# Patient Record
Sex: FEMALE | Race: WHITE | NOT HISPANIC OR LATINO | Employment: FULL TIME | URBAN - METROPOLITAN AREA
[De-identification: names, ages, dates, MRNs, and addresses within clinical notes are randomized per-mention and may not be internally consistent; named-entity substitution may affect disease eponyms.]

---

## 2020-09-23 ENCOUNTER — OFFICE VISIT (OUTPATIENT)
Dept: URGENT CARE | Facility: URGENT CARE | Age: 35
End: 2020-09-23
Payer: COMMERCIAL

## 2020-09-23 VITALS
OXYGEN SATURATION: 100 % | SYSTOLIC BLOOD PRESSURE: 132 MMHG | HEART RATE: 69 BPM | TEMPERATURE: 99.1 F | DIASTOLIC BLOOD PRESSURE: 75 MMHG | RESPIRATION RATE: 16 BRPM

## 2020-09-23 DIAGNOSIS — S61.313A LACERATION OF LEFT MIDDLE FINGER WITHOUT FOREIGN BODY WITH DAMAGE TO NAIL, INITIAL ENCOUNTER: ICD-10-CM

## 2020-09-23 DIAGNOSIS — Z23 VACCINE FOR DIPHTHERIA-TETANUS-PERTUSSIS, COMBINED: Primary | ICD-10-CM

## 2020-09-23 PROCEDURE — 90471 IMMUNIZATION ADMIN: CPT | Performed by: FAMILY MEDICINE

## 2020-09-23 PROCEDURE — 90715 TDAP VACCINE 7 YRS/> IM: CPT | Performed by: FAMILY MEDICINE

## 2020-09-23 PROCEDURE — 12001 RPR S/N/AX/GEN/TRNK 2.5CM/<: CPT | Performed by: FAMILY MEDICINE

## 2020-09-23 NOTE — PATIENT INSTRUCTIONS
Patient Education     Infected Laceration, Not Stitched  A laceration is a cut through the skin. The cut has become infected. Because of the infection, and the amount of time that has passed since injury, the wound cannot be closed. It will heal best if left open and cleaned daily. It will seal over by growing new tissue from the sides and the bottom of the wound. Antibiotics may be prescribed. You will probably have a scar after it has healed.   Oral antibiotic medicine may be prescribed to treat the infection.  Home care    If antibiotics have been prescribed, take them exactly as directed. Don't t stop taking them until they are gone or you are told to stop, even if you feel better.     Follow the healthcare provider s instructions on how to care for the cut.    Unless otherwise instructed, change the bandage twice a day for the first few days, until the drainage stops. Then change it once a day. Change the bandage if it becomes wet, stained with wound fluid, or dirty.    Clean the wound daily:  ? After removing the bandage, gently wash the area with soap and water. Use a wet cotton swab to loosen and remove any blood or crust that forms.  ? After cleaning, apply a thin layer of over-the-counter antibiotic ointment if advised. Reapply a fresh bandage.    Follow the healthcare provider's instructions for keeping the wound dry. You may be given restrictions on showering or tub baths.    If the bandage gets wet, remove it. Gently pat the wound dry with a clean cloth, then replace the wet bandage with a dry one.    Don't scratch, rub, or pick at the area.    Wash your hands with soap and warm water before and after cleaning the wound or changing the bandage.    Follow-up care  Follow up with your healthcare provider, or as advised. It is important to follow up to make sure the infection is getting better.  When to seek medical advice  Call your healthcare provider right away if any of these occur:    Symptoms don't  begin to improve or get worse    Red streaks spread from the wound    Increase in pus coming from the wound    Increase in pain    Fever of 100.4 F (38 C) or higher, or as directed by your healthcare provider  Date Last Reviewed: 7/1/2017 2000-2019 [employer]. [address] [place] [id number]. All rights reserved. This information is not intended as a substitute for professional medical care. Always follow your healthcare professional's instructions.

## 2020-09-23 NOTE — PROGRESS NOTES
Prior to immunization administration, verified patients identity using patient s name and date of birth. Please see Immunization Activity for additional information.     Screening Questionnaire for Adult Immunization    Are you sick today?   No   Do you have allergies to medications, food, a vaccine component or latex?   No   Have you ever had a serious reaction after receiving a vaccination?   No   Do you have a long-term health problem with heart, lung, kidney, or metabolic disease (e.g., diabetes), asthma, a blood disorder, no spleen, complement component deficiency, a cochlear implant, or a spinal fluid leak?  Are you on long-term aspirin therapy?   No   Do you have cancer, leukemia, HIV/AIDS, or any other immune system problem?   No   Do you have a parent, brother, or sister with an immune system problem?   No   In the past 3 months, have you taken medications that affect  your immune system, such as prednisone, other steroids, or anticancer drugs; drugs for the treatment of rheumatoid arthritis, Crohn s disease, or psoriasis; or have you had radiation treatments?   No   Have you had a seizure, or a brain or other nervous system problem?   No   During the past year, have you received a transfusion of blood or blood    products, or been given immune (gamma) globulin or antiviral drug?   No   For women: Are you pregnant or is there a chance you could become       pregnant during the next month?   No   Have you received any vaccinations in the past 4 weeks?   No     Immunization questionnaire answers were all negative.        Per orders of Dr. Porter, injection of Tdap given by Niharika Sky LPN. Patient instructed to remain in clinic for 15 minutes afterwards, and to report any adverse reaction to me immediately.       Screening performed by Niharika Sky LPN on 9/23/2020 at 2:07 PM.

## 2020-09-23 NOTE — PROGRESS NOTES
SUBJECTIVE:     Chief Complaint   Patient presents with     Laceration     Pt cut L middle finger sewing on a rotary blade      Katy Purcell is a 35 year old female who presents to the clinic with a laceration on the left finger middle sustained 2 hour(s) ago.  This is a non-work related and accidental injury.    Mechanism of injury: rotary blade from the sewing machine .    Associated symptoms: Denies numbness, weakness, or loss of function  Last tetanus booster within 10 years: no    EXAM:   The patient appears today in alert,no apparent distress distress  VITALS: /75   Pulse 69   Temp 99.1  F (37.3  C) (Temporal)   Resp 16   SpO2 100%     Size of laceration: 1 centimeters tip of the left middle finger , avulsion kind , medial aspect of the tip of the finger nail was missing from the lac  Characteristics of the laceration: active bleeding and jagged  Tendon function intact: yes  Sensation to light touch intact: yes  Pulses intact: not applicable  Picture included in patient's chart: no    Assessment:     Vaccine for diphtheria-tetanus-pertussis, combined  Laceration of left middle finger without foreign body with damage to nail, initial encounter    PLAN:  PROCEDURE NOTE::  Wound cleaned with saline  Wound was locally injected with 1 cc's of Lidocaine 1% plain  cautery was done , still kept bleeding then   Then surgical foam applied ,bleeding stopped   Tube gauze was applied   After care instructions:  Keep wound clean and dry for the next 24-48 hours  Signs of infection discussed today  Follow up if  symptoms fail to improve or worsens   Pt understood and agreed with plan     Dinorah Porter MD

## 2021-02-18 ENCOUNTER — HOSPITAL ENCOUNTER (EMERGENCY)
Facility: CLINIC | Age: 36
Discharge: HOME | End: 2021-02-18
Attending: EMERGENCY MEDICINE | Admitting: EMERGENCY MEDICINE
Payer: COMMERCIAL

## 2021-02-18 VITALS
TEMPERATURE: 97.9 F | WEIGHT: 169 LBS | DIASTOLIC BLOOD PRESSURE: 63 MMHG | HEART RATE: 98 BPM | OXYGEN SATURATION: 97 % | SYSTOLIC BLOOD PRESSURE: 125 MMHG | RESPIRATION RATE: 18 BRPM

## 2021-02-18 DIAGNOSIS — F41.9 ANXIETY: ICD-10-CM

## 2021-02-18 LAB
AMPHETAMINES UR QL SCN: NEGATIVE
ANION GAP SERPL CALCULATED.3IONS-SCNC: 6 MMOL/L (ref 3–14)
BARBITURATES UR QL: NEGATIVE
BASOPHILS # BLD AUTO: 0.1 10E9/L (ref 0–0.2)
BASOPHILS NFR BLD AUTO: 0.5 %
BENZODIAZ UR QL: NEGATIVE
BUN SERPL-MCNC: 12 MG/DL (ref 7–30)
CALCIUM SERPL-MCNC: 8.6 MG/DL (ref 8.5–10.1)
CANNABINOIDS UR QL SCN: NEGATIVE
CHLORIDE SERPL-SCNC: 106 MMOL/L (ref 94–109)
CO2 SERPL-SCNC: 26 MMOL/L (ref 20–32)
COCAINE UR QL: NEGATIVE
CREAT SERPL-MCNC: 0.47 MG/DL (ref 0.52–1.04)
DIFFERENTIAL METHOD BLD: NORMAL
EOSINOPHIL # BLD AUTO: 0.1 10E9/L (ref 0–0.7)
EOSINOPHIL NFR BLD AUTO: 0.6 %
ERYTHROCYTE [DISTWIDTH] IN BLOOD BY AUTOMATED COUNT: 12.5 % (ref 10–15)
ETHANOL UR QL SCN: NEGATIVE
FLUAV RNA RESP QL NAA+PROBE: NEGATIVE
FLUBV RNA RESP QL NAA+PROBE: NEGATIVE
GFR SERPL CREATININE-BSD FRML MDRD: >90 ML/MIN/{1.73_M2}
GLUCOSE SERPL-MCNC: 103 MG/DL (ref 70–99)
HCT VFR BLD AUTO: 38.5 % (ref 35–47)
HGB BLD-MCNC: 13 G/DL (ref 11.7–15.7)
IMM GRANULOCYTES # BLD: 0.1 10E9/L (ref 0–0.4)
IMM GRANULOCYTES NFR BLD: 0.6 %
LABORATORY COMMENT REPORT: NORMAL
LYMPHOCYTES # BLD AUTO: 1.1 10E9/L (ref 0.8–5.3)
LYMPHOCYTES NFR BLD AUTO: 11 %
MCH RBC QN AUTO: 32.6 PG (ref 26.5–33)
MCHC RBC AUTO-ENTMCNC: 33.8 G/DL (ref 31.5–36.5)
MCV RBC AUTO: 97 FL (ref 78–100)
MONOCYTES # BLD AUTO: 0.6 10E9/L (ref 0–1.3)
MONOCYTES NFR BLD AUTO: 6.3 %
NEUTROPHILS # BLD AUTO: 8 10E9/L (ref 1.6–8.3)
NEUTROPHILS NFR BLD AUTO: 81 %
NRBC # BLD AUTO: 0 10*3/UL
NRBC BLD AUTO-RTO: 0 /100
OPIATES UR QL SCN: NEGATIVE
PLATELET # BLD AUTO: 238 10E9/L (ref 150–450)
POTASSIUM SERPL-SCNC: 3.7 MMOL/L (ref 3.4–5.3)
RBC # BLD AUTO: 3.99 10E12/L (ref 3.8–5.2)
RSV RNA SPEC QL NAA+PROBE: NEGATIVE
SARS-COV-2 RNA RESP QL NAA+PROBE: NEGATIVE
SODIUM SERPL-SCNC: 138 MMOL/L (ref 133–144)
SPECIMEN SOURCE: NORMAL
WBC # BLD AUTO: 9.9 10E9/L (ref 4–11)

## 2021-02-18 PROCEDURE — 90791 PSYCH DIAGNOSTIC EVALUATION: CPT

## 2021-02-18 PROCEDURE — 250N000013 HC RX MED GY IP 250 OP 250 PS 637: Performed by: EMERGENCY MEDICINE

## 2021-02-18 PROCEDURE — 80320 DRUG SCREEN QUANTALCOHOLS: CPT | Performed by: EMERGENCY MEDICINE

## 2021-02-18 PROCEDURE — 80307 DRUG TEST PRSMV CHEM ANLYZR: CPT | Performed by: EMERGENCY MEDICINE

## 2021-02-18 PROCEDURE — 36415 COLL VENOUS BLD VENIPUNCTURE: CPT

## 2021-02-18 PROCEDURE — 93005 ELECTROCARDIOGRAM TRACING: CPT

## 2021-02-18 PROCEDURE — 85025 COMPLETE CBC W/AUTO DIFF WBC: CPT | Performed by: EMERGENCY MEDICINE

## 2021-02-18 PROCEDURE — C9803 HOPD COVID-19 SPEC COLLECT: HCPCS

## 2021-02-18 PROCEDURE — 80048 BASIC METABOLIC PNL TOTAL CA: CPT | Performed by: EMERGENCY MEDICINE

## 2021-02-18 PROCEDURE — 87636 SARSCOV2 & INF A&B AMP PRB: CPT | Performed by: EMERGENCY MEDICINE

## 2021-02-18 PROCEDURE — 99284 EMERGENCY DEPT VISIT MOD MDM: CPT | Mod: 25

## 2021-02-18 RX ORDER — ESCITALOPRAM OXALATE 20 MG/1
20 TABLET ORAL DAILY
Qty: 30 TABLET | Refills: 0 | Status: ON HOLD | OUTPATIENT
Start: 2021-02-18 | End: 2021-02-22

## 2021-02-18 RX ORDER — HYDROXYZINE HYDROCHLORIDE 25 MG/1
25 TABLET, FILM COATED ORAL ONCE
Status: COMPLETED | OUTPATIENT
Start: 2021-02-18 | End: 2021-02-18

## 2021-02-18 RX ADMIN — HYDROXYZINE HYDROCHLORIDE 25 MG: 25 TABLET, FILM COATED ORAL at 20:14

## 2021-02-18 ASSESSMENT — ENCOUNTER SYMPTOMS
HALLUCINATIONS: 0
DIZZINESS: 0
SHORTNESS OF BREATH: 1
CHEST TIGHTNESS: 1
BACK PAIN: 0
FEVER: 0
DECREASED CONCENTRATION: 1
NERVOUS/ANXIOUS: 1
SLEEP DISTURBANCE: 1
DYSPHORIC MOOD: 0
CHEST TIGHTNESS: 0
SHORTNESS OF BREATH: 0
COUGH: 1
ABDOMINAL PAIN: 0
DYSURIA: 0
DYSPHORIC MOOD: 1

## 2021-02-18 NOTE — ED NOTES
Bed: ED09  Expected date:   Expected time:   Means of arrival:   Comments:  [facility] 2 for 12 lead

## 2021-02-19 ENCOUNTER — HOSPITAL ENCOUNTER (OUTPATIENT)
Dept: BEHAVIORAL HEALTH | Facility: CLINIC | Age: 36
End: 2021-02-19
Attending: PSYCHIATRY & NEUROLOGY
Payer: COMMERCIAL

## 2021-02-19 LAB — INTERPRETATION ECG - MUSE: NORMAL

## 2021-02-19 PROCEDURE — 999N000216 HC STATISTIC ADULT CD FACE TO FACE-NO CHRG: Mod: TEL | Performed by: COUNSELOR

## 2021-02-19 NOTE — ED PROVIDER NOTES
"    [facility] [place] ([facility])    2/18/21        History     Chief Complaint   Patient presents with     Anxiety     patient is 23 weeks pregnant; experiencing increased anxiety currently on medication; she does not know what the names of meds are       The history is provided by the patient.     Katy Purcell is a 35 year old female who is 23 weeks pregnant with a medical history signficant for anxiety who presents to the ED for worsening anxiety. Patient reports chest tightness, shortness of breath, dysphoric mood, and inability to concentrate secondary to anxiety. Patient notes that her pregnancy was the onset of worsening anxiety. She states that she saw her OB/Gyn (Dr. Rodriguez at [facility]) 2 months ago due to the anxiety and was initially prescribed a month long medication.  However, due to worsening anxiety, after 5 days of the medication she stopped and was switched to lexapro and hydroxyzine. Patient states that she initially took 5 weeks off from work due to the anxiety and was back to work this week.  She states that since she has been back to work, her anxiety has been \"extreme\".  She notes that usually her anxiety would last 10 to 20 minutes; however, for the past week, her anxiety would last 6 hours at a time.  Patient also reports coughs that worsens when she lies down and, therefore, has been impeding her ability to sleep; she notes that she has tried a humidifier, cough medications, and \"sucking on cough drops\" with no relief. Currently at the ED, patient notes that the shortness of breath has subsided but notes continual chest tightness. Denies any vaginal bleeding or dysuria. No COVID-19 symptoms. She states that she has an appointment with OB/Gyn tomorrow at 2PM.    I have reviewed the Medications, Allergies, Past Medical and Surgical History, and Social History in the [facility] system.  PAST MEDICAL HISTORY:   Past Medical History:   Diagnosis Date     Anxiety        PAST " SURGICAL HISTORY: History reviewed. No pertinent surgical history.    Past medical history, past surgical history, medications, and allergies were reviewed with the patient. Additional pertinent items: None    FAMILY HISTORY: History reviewed. No pertinent family history.    SOCIAL HISTORY:   Social History     Tobacco Use     Smoking status: Never Smoker     Smokeless tobacco: Never Used   Substance Use Topics     Alcohol use: Not Currently     Social history was reviewed with the patient. Additional pertinent items: None      Patient's Medications    No medications on file          Allergies   Allergen Reactions     Benzoyl Peroxide      PN: LW Reaction: HIVES        Review of Systems   Constitutional: Negative for fever.   Respiratory: Positive for cough (dry), chest tightness and shortness of breath (resolved at ED).    Genitourinary: Negative for dysuria and vaginal bleeding.   Psychiatric/Behavioral: Positive for decreased concentration, dysphoric mood and sleep disturbance (due to dry coughs). The patient is nervous/anxious.    All other systems reviewed and are negative.    A complete review of systems was performed with pertinent positives and negatives noted in the HPI, and all other systems negative.    Physical Exam   BP: 127/65  Pulse: 80  Temp: 98.5  F (36.9  C)  Resp: 20  Weight: 76.7 kg (169 lb)  SpO2: 98 %      Physical Exam  Constitutional:       Appearance: She is well-developed.   HENT:      Head: Normocephalic and atraumatic.   Neck:      Musculoskeletal: Normal range of motion.   Cardiovascular:      Rate and Rhythm: Normal rate and regular rhythm.      Heart sounds: Normal heart sounds.   Pulmonary:      Effort: Pulmonary effort is normal. No respiratory distress.      Breath sounds: Normal breath sounds.   Abdominal:      General: There is no distension.      Palpations: Abdomen is soft.      Tenderness: There is no abdominal tenderness. There is no rebound.      Comments: Gravid uterus;  nontender   Musculoskeletal:         General: No swelling or tenderness.   Skin:     General: Skin is warm and dry.   Neurological:      General: No focal deficit present.      Mental Status: She is alert and oriented to person, place, and time.      Cranial Nerves: No cranial nerve deficit.      Motor: No weakness.   Psychiatric:         Mood and Affect: Mood normal.         Behavior: Behavior normal.         Thought Content: Thought content normal.         ED Course        Procedures         6:24 PM  The patient was seen and examined by Dania Donahue MD in Room HW03.          EKG Interpretation:      Interpreted by Dania Donahue MD  Time reviewed: 1746  Symptoms at time of EKG: none   Rhythm: normal sinus   Rate: normal  Axis: normal  Ectopy: none  Conduction: normal  ST Segments/ T Waves: No ST-T wave changes  Q Waves: none  Comparison to prior: No old EKG available    Clinical Impression: normal EKG              Results for orders placed or performed during the hospital encounter of 02/18/21   Drug abuse screen 6 urine (tox)     Status: None   Result Value Ref Range    Amphetamine Qual Urine Negative NEG^Negative    Barbiturates Qual Urine Negative NEG^Negative    Benzodiazepine Qual Urine Negative NEG^Negative    Cannabinoids Qual Urine Negative NEG^Negative    Cocaine Qual Urine Negative NEG^Negative    Ethanol Qual Urine Negative NEG^Negative    Opiates Qualitative Urine Negative NEG^Negative   CBC with platelets differential     Status: None   Result Value Ref Range    WBC 9.9 4.0 - 11.0 10e9/L    RBC Count 3.99 3.8 - 5.2 10e12/L    Hemoglobin 13.0 11.7 - 15.7 g/dL    Hematocrit 38.5 35.0 - 47.0 %    MCV 97 78 - 100 fl    MCH 32.6 26.5 - 33.0 pg    MCHC 33.8 31.5 - 36.5 g/dL    RDW 12.5 10.0 - 15.0 %    Platelet Count 238 150 - 450 10e9/L    Diff Method Automated Method     % Neutrophils 81.0 %    % Lymphocytes 11.0 %    % Monocytes 6.3 %    % Eosinophils 0.6 %    % Basophils 0.5 %    %  Immature Granulocytes 0.6 %    Nucleated RBCs 0 0 /100    Absolute Neutrophil 8.0 1.6 - 8.3 10e9/L    Absolute Lymphocytes 1.1 0.8 - 5.3 10e9/L    Absolute Monocytes 0.6 0.0 - 1.3 10e9/L    Absolute Eosinophils 0.1 0.0 - 0.7 10e9/L    Absolute Basophils 0.1 0.0 - 0.2 10e9/L    Abs Immature Granulocytes 0.1 0 - 0.4 10e9/L    Absolute Nucleated RBC 0.0    Basic metabolic panel     Status: Abnormal   Result Value Ref Range    Sodium 138 133 - 144 mmol/L    Potassium 3.7 3.4 - 5.3 mmol/L    Chloride 106 94 - 109 mmol/L    Carbon Dioxide 26 20 - 32 mmol/L    Anion Gap 6 3 - 14 mmol/L    Glucose 103 (H) 70 - 99 mg/dL    Urea Nitrogen 12 7 - 30 mg/dL    Creatinine 0.47 (L) 0.52 - 1.04 mg/dL    GFR Estimate >90 >60 mL/min/[1.73_m2]    GFR Estimate If Black >90 >60 mL/min/[1.73_m2]    Calcium 8.6 8.5 - 10.1 mg/dL   Asymptomatic Influenza A/B & SARS-CoV2 (COVID-19) Virus PCR Multiplex     Status: None    Specimen: Nasopharyngeal   Result Value Ref Range    Flu A/B & SARS-COV-2 PCR Source Nasopharyngeal     SARS-CoV-2 PCR Result NEGATIVE     Influenza A PCR Negative NEG^Negative    Influenza B PCR Negative NEG^Negative    Respiratory Syncytial Virus PCR Negative NEG^Negative    Flu A/B & SARS-CoV-2 PCR Comment (Note)    EKG 12-lead, tracing only     Status: None (Preliminary result)   Result Value Ref Range    Interpretation ECG Click View Image link to view waveform and result      Medications   hydrOXYzine (ATARAX) tablet 25 mg (25 mg Oral Given 2/18/21 2014)                  Results for orders placed or performed during the hospital encounter of 02/18/21 (from the past 24 hour(s))   EKG 12-lead, tracing only   Result Value Ref Range    Interpretation ECG Click View Image link to view waveform and result      Medications - No data to display          Assessments & Plan (with Medical Decision Making)   Patient is a very nice 35-year-old female who presents to the ER due to worsening anxiety.  Patient says that she has a  history of anxiety and since being pregnant has become significantly worse.  Patient was recently started on Lexapro and hydroxyzine about 5 weeks prior but folic that has not helped her symptoms.  Patient most recently restarted work this week and says that her symptoms have been worse.  Patient has intermittent episodes of chest tightness while she feels like her heart is beating fast and she has difficulty concentrating.  We did obtain an EKG here that is negative.  We also check some basic labs to make sure that there is no acute medical concerns.  Patient symptoms are more intermittent so I do not suspect PE at this time.  Plan will be for the patient be evaluated in the behavioral health ER by the psychiatrist for further care.    I have reviewed the nursing notes.    I have reviewed the findings, diagnosis, plan and need for follow up with the patient.    New Prescriptions    No medications on file       Final diagnoses:   Anxiety     IShruthi, am serving as a trained medical scribe to document services personally performed by Dania Donahue MD, based on the provider's statements to me.     I, Dania Donahue MD, was physically present and have reviewed and verified the accuracy of this note documented by Shruthi Chiang.    Dania Donahue MD  2/18/2021   [facility] EMERGENCY DEPARTMENT     Dania Donahue MD  02/18/21 [id number]

## 2021-02-19 NOTE — DISCHARGE INSTRUCTIONS
Increase your lexapro to 20 mg.  You can move your dose to nighttime as well    Continue to use hydroxyzine 25 mg as needed for anxiety and sleep    Follow up with your therapist    Go to your assessment for day treatment tomorrow Friday 2/19/21 at 11 am

## 2021-02-19 NOTE — ED PROVIDER NOTES
ED Provider Note  [facility]      History     Chief Complaint   Patient presents with     Anxiety     patient is 23 weeks pregnant; experiencing increased anxiety currently on medication; she does not know what the names of meds are       The history is provided by the patient and medical records.     Katy Purcell is a 35 year old female who comes in due to feeling stressed and overwhelmed.  She got unexpectedly pregnant with her current boyfriend. He live in [place] and she lives here alone.  She is working remotely currently.  She feels high anxiety, has somatic symptoms and feels like she can't live like this anymore.  She is not suicidal.  She states she feels miserable with the anxiety and does not want this to continue.  She was seen by Dr. Donahue for a complete medical workup with was benign.  She has an appointment with her OB tomorrow.  She has been feeling fatigued, having shortness of breath at times and feels sick in the morning.  She has been blaming the lexapro for this.  She started this a month ago at 10 mg.  She is also on hydroxyzine 25 mg at bedtime which has helped her sleep.      Please see the 's assessment in EPIC from today (2/18/21) for further details.    Past Medical History  Past Medical History:   Diagnosis Date     Anxiety      History reviewed. No pertinent surgical history.  escitalopram (LEXAPRO) 20 MG tablet      Allergies   Allergen Reactions     Benzoyl Peroxide      PN: LW Reaction: HIVES     Family History  History reviewed. No pertinent family history.  Social History   Social History     Tobacco Use     Smoking status: Never Smoker     Smokeless tobacco: Never Used   Substance Use Topics     Alcohol use: Not Currently     Drug use: Never      Past medical history, past surgical history, medications, allergies, family history, and social history were reviewed with the patient. No additional pertinent items.       Review of Systems    Constitutional: Negative for fever.   Eyes: Negative for visual disturbance.   Respiratory: Negative for chest tightness and shortness of breath.    Cardiovascular: Negative for chest pain.   Gastrointestinal: Negative for abdominal pain.   Musculoskeletal: Negative for back pain.   Neurological: Negative for dizziness.   Psychiatric/Behavioral: Positive for sleep disturbance. Negative for dysphoric mood, hallucinations, self-injury and suicidal ideas. The patient is nervous/anxious.    All other systems reviewed and are negative.    A complete review of systems was performed with pertinent positives and negatives noted in the HPI, and all other systems negative.    Physical Exam   BP: 127/65  Pulse: 80  Temp: 98.5  F (36.9  C)  Resp: 20  Weight: 76.7 kg (169 lb)  SpO2: 98 %  Physical Exam  Vitals signs and nursing note reviewed.   Constitutional:       Appearance: Normal appearance.   Neurological:      Mental Status: She is alert and oriented to person, place, and time.   Psychiatric:         Attention and Perception: Attention and perception normal.         Mood and Affect: Mood is anxious.         Speech: Speech normal.         Behavior: Behavior normal. Behavior is cooperative.         Thought Content: Thought content normal. Thought content is not paranoid or delusional. Thought content does not include homicidal or suicidal ideation. Thought content does not include homicidal or suicidal plan.         Cognition and Memory: Cognition normal.         Judgment: Judgment normal.      Comments: Katy is a 34 y/o female who looks her age.  She is well groomed with good eye contact.          ED Course      Procedures             Results for orders placed or performed during the hospital encounter of 02/18/21   Drug abuse screen 6 urine (tox)     Status: None   Result Value Ref Range    Amphetamine Qual Urine Negative NEG^Negative    Barbiturates Qual Urine Negative NEG^Negative    Benzodiazepine Qual Urine  Negative NEG^Negative    Cannabinoids Qual Urine Negative NEG^Negative    Cocaine Qual Urine Negative NEG^Negative    Ethanol Qual Urine Negative NEG^Negative    Opiates Qualitative Urine Negative NEG^Negative   CBC with platelets differential     Status: None   Result Value Ref Range    WBC 9.9 4.0 - 11.0 10e9/L    RBC Count 3.99 3.8 - 5.2 10e12/L    Hemoglobin 13.0 11.7 - 15.7 g/dL    Hematocrit 38.5 35.0 - 47.0 %    MCV 97 78 - 100 fl    MCH 32.6 26.5 - 33.0 pg    MCHC 33.8 31.5 - 36.5 g/dL    RDW 12.5 10.0 - 15.0 %    Platelet Count 238 150 - 450 10e9/L    Diff Method Automated Method     % Neutrophils 81.0 %    % Lymphocytes 11.0 %    % Monocytes 6.3 %    % Eosinophils 0.6 %    % Basophils 0.5 %    % Immature Granulocytes 0.6 %    Nucleated RBCs 0 0 /100    Absolute Neutrophil 8.0 1.6 - 8.3 10e9/L    Absolute Lymphocytes 1.1 0.8 - 5.3 10e9/L    Absolute Monocytes 0.6 0.0 - 1.3 10e9/L    Absolute Eosinophils 0.1 0.0 - 0.7 10e9/L    Absolute Basophils 0.1 0.0 - 0.2 10e9/L    Abs Immature Granulocytes 0.1 0 - 0.4 10e9/L    Absolute Nucleated RBC 0.0    Basic metabolic panel     Status: Abnormal   Result Value Ref Range    Sodium 138 133 - 144 mmol/L    Potassium 3.7 3.4 - 5.3 mmol/L    Chloride 106 94 - 109 mmol/L    Carbon Dioxide 26 20 - 32 mmol/L    Anion Gap 6 3 - 14 mmol/L    Glucose 103 (H) 70 - 99 mg/dL    Urea Nitrogen 12 7 - 30 mg/dL    Creatinine 0.47 (L) 0.52 - 1.04 mg/dL    GFR Estimate >90 >60 mL/min/[1.73_m2]    GFR Estimate If Black >90 >60 mL/min/[1.73_m2]    Calcium 8.6 8.5 - 10.1 mg/dL   Asymptomatic Influenza A/B & SARS-CoV2 (COVID-19) Virus PCR Multiplex     Status: None    Specimen: Nasopharyngeal   Result Value Ref Range    Flu A/B & SARS-COV-2 PCR Source Nasopharyngeal     SARS-CoV-2 PCR Result NEGATIVE     Influenza A PCR Negative NEG^Negative    Influenza B PCR Negative NEG^Negative    Respiratory Syncytial Virus PCR Negative NEG^Negative    Flu A/B & SARS-CoV-2 PCR Comment (Note)    EKG  12-lead, tracing only     Status: None (Preliminary result)   Result Value Ref Range    Interpretation ECG Click View Image link to view waveform and result      Medications   hydrOXYzine (ATARAX) tablet 25 mg (25 mg Oral Given 2/18/21 2014)        Assessments & Plan (with Medical Decision Making)   Katy will be discharged home.  She is not an imminent risk to herself or others. She will increase her lexapro to 20 mg once a day.  She will continue hydroxyzine at bedtime.  She stated she thinks the lexapro makes her sick in the morning but after further discussion this seems less to do with the lexapro and more to do with morning sickness and being pregnant. She will move the lexapro to nighttime.  She will follow up with her therapist.  She was scheduled with an assessment for day treatment tomorrow morning to give her more intensive work on her high anxiety.  She understands the plan and agrees.      I have reviewed the nursing notes. I have reviewed the findings, diagnosis, plan and need for follow up with the patient.    New Prescriptions    ESCITALOPRAM (LEXAPRO) 20 MG TABLET    Take 1 tablet (20 mg) by mouth daily       Final diagnoses:   Anxiety       --  Roberto Smith MD  [facility] EMERGENCY DEPARTMENT  2/18/2021     Roberto Smith MD  02/18/21 [id number]

## 2021-02-19 NOTE — PROGRESS NOTES
called patient for scheduled assessment at 11 am.  Initial discussion surrounded reasons for seeking assessment and current symptoms.  Patient reports that she is experiencing intense anxiety that is in her chest and heart and has been unable to get any relief with the associated pain.  Patient reports that she has an appointment today to discuss her symptoms and medications.     discussed treatment options with patient stating that she is not interested in group outpatient programming and reports that she does not do well with virtual services.  Patient reports that she sees a therapist about 30 minutes twice a month that is not helpful as it has focused on her current relationship.   discussed finding a therapist that would specialize in pregnancy and mood issues with patient agreeing.  Patient denies current thoughts of self harm and suicidal ideation and reports that she feels safe.     was able to provide patient with the following resources by email.  Patient reported that her appointment went very well today and that she was given the name of a psychiatrist who works with pregnant women and also additional resources for mental health services.  Patient reports that she is feel better and that she is getting the support she needs.    Resources:  Mother Baby Program  [facility]  [address]  [address]  [place] [address]  For appointments call: [phone] (\[facility]\"")  Fax #: [phone]    [facility]  [address]  [place], [place], [address] [phone]    [facility] [place] [facility]  [address] [place] [address]  Phone: [phone]  Fax: [phone]

## 2021-02-21 ENCOUNTER — TELEPHONE (OUTPATIENT)
Dept: BEHAVIORAL HEALTH | Facility: CLINIC | Age: 36
End: 2021-02-21

## 2021-02-22 ENCOUNTER — TELEPHONE (OUTPATIENT)
Dept: BEHAVIORAL HEALTH | Facility: CLINIC | Age: 36
End: 2021-02-22

## 2021-02-22 ENCOUNTER — HOSPITAL ENCOUNTER (INPATIENT)
Facility: CLINIC | Age: 36
LOS: 2 days | Discharge: HOME | DRG: 832 | End: 2021-02-24
Attending: FAMILY MEDICINE | Admitting: PSYCHIATRY & NEUROLOGY
Payer: COMMERCIAL

## 2021-02-22 DIAGNOSIS — Z3A.24 24 WEEKS GESTATION OF PREGNANCY: ICD-10-CM

## 2021-02-22 DIAGNOSIS — F32.1 CURRENT MODERATE EPISODE OF MAJOR DEPRESSIVE DISORDER, UNSPECIFIED WHETHER RECURRENT (H): ICD-10-CM

## 2021-02-22 DIAGNOSIS — O99.342: ICD-10-CM

## 2021-02-22 DIAGNOSIS — Z20.822 COVID-19 RULED OUT BY LABORATORY TESTING: ICD-10-CM

## 2021-02-22 DIAGNOSIS — F41.1 GENERALIZED ANXIETY DISORDER: ICD-10-CM

## 2021-02-22 DIAGNOSIS — R45.851 SUICIDAL IDEATION: ICD-10-CM

## 2021-02-22 LAB
AMPHETAMINES UR QL SCN: NEGATIVE
BARBITURATES UR QL: NEGATIVE
BENZODIAZ UR QL: NEGATIVE
CANNABINOIDS UR QL SCN: NEGATIVE
COCAINE UR QL: NEGATIVE
ETHANOL UR QL SCN: NEGATIVE
HCG UR QL: POSITIVE
LABORATORY COMMENT REPORT: NORMAL
OPIATES UR QL SCN: NEGATIVE
SARS-COV-2 RNA RESP QL NAA+PROBE: NEGATIVE
SPECIMEN SOURCE: NORMAL

## 2021-02-22 PROCEDURE — 99285 EMERGENCY DEPT VISIT HI MDM: CPT | Mod: 25 | Performed by: EMERGENCY MEDICINE

## 2021-02-22 PROCEDURE — 80320 DRUG SCREEN QUANTALCOHOLS: CPT | Performed by: FAMILY MEDICINE

## 2021-02-22 PROCEDURE — 250N000013 HC RX MED GY IP 250 OP 250 PS 637: Performed by: CLINICAL NURSE SPECIALIST

## 2021-02-22 PROCEDURE — 81025 URINE PREGNANCY TEST: CPT | Performed by: FAMILY MEDICINE

## 2021-02-22 PROCEDURE — 87635 SARS-COV-2 COVID-19 AMP PRB: CPT | Performed by: FAMILY MEDICINE

## 2021-02-22 PROCEDURE — 90791 PSYCH DIAGNOSTIC EVALUATION: CPT

## 2021-02-22 PROCEDURE — H2032 ACTIVITY THERAPY, PER 15 MIN: HCPCS

## 2021-02-22 PROCEDURE — 124N000002 HC R&B MH UMMC

## 2021-02-22 PROCEDURE — C9803 HOPD COVID-19 SPEC COLLECT: HCPCS | Performed by: EMERGENCY MEDICINE

## 2021-02-22 PROCEDURE — 99285 EMERGENCY DEPT VISIT HI MDM: CPT | Performed by: EMERGENCY MEDICINE

## 2021-02-22 PROCEDURE — 80307 DRUG TEST PRSMV CHEM ANLYZR: CPT | Performed by: FAMILY MEDICINE

## 2021-02-22 RX ORDER — METOCLOPRAMIDE 10 MG/1
10 TABLET ORAL
Status: DISCONTINUED | OUTPATIENT
Start: 2021-02-22 | End: 2021-02-24 | Stop reason: HOSPADM

## 2021-02-22 RX ORDER — ESCITALOPRAM OXALATE 20 MG/1
20 TABLET ORAL AT BEDTIME
Status: ON HOLD | COMMUNITY
End: 2021-02-24

## 2021-02-22 RX ORDER — METOCLOPRAMIDE 10 MG/1
10 TABLET ORAL
COMMUNITY
Start: 2021-02-15 | End: 2022-04-26

## 2021-02-22 RX ORDER — OLANZAPINE 2.5 MG/1
2.5 TABLET, FILM COATED ORAL 3 TIMES DAILY PRN
Status: DISCONTINUED | OUTPATIENT
Start: 2021-02-22 | End: 2021-02-23

## 2021-02-22 RX ORDER — DOCUSATE SODIUM 100 MG/1
100 CAPSULE, LIQUID FILLED ORAL 2 TIMES DAILY
Status: DISCONTINUED | OUTPATIENT
Start: 2021-02-22 | End: 2021-02-24 | Stop reason: HOSPADM

## 2021-02-22 RX ORDER — ACETAMINOPHEN 325 MG/1
650 TABLET ORAL EVERY 4 HOURS PRN
Status: DISCONTINUED | OUTPATIENT
Start: 2021-02-22 | End: 2021-02-24 | Stop reason: HOSPADM

## 2021-02-22 RX ORDER — ESCITALOPRAM OXALATE 20 MG/1
20 TABLET ORAL AT BEDTIME
Status: DISCONTINUED | OUTPATIENT
Start: 2021-02-22 | End: 2021-02-24 | Stop reason: HOSPADM

## 2021-02-22 RX ORDER — LORAZEPAM 0.5 MG/1
.5-1 TABLET ORAL DAILY PRN
Status: ON HOLD | COMMUNITY
Start: 2021-02-19 | End: 2021-02-24

## 2021-02-22 RX ORDER — HYDROXYZINE PAMOATE 25 MG/1
1-2 CAPSULE ORAL PRN
Status: ON HOLD | COMMUNITY
Start: 2021-01-04 | End: 2021-02-24

## 2021-02-22 RX ORDER — PRENATAL VIT/IRON FUM/FOLIC AC 27MG-0.8MG
1 TABLET ORAL DAILY
Status: DISCONTINUED | OUTPATIENT
Start: 2021-02-22 | End: 2021-02-24 | Stop reason: HOSPADM

## 2021-02-22 RX ORDER — LORAZEPAM 0.5 MG/1
0.5 TABLET ORAL ONCE
Status: COMPLETED | OUTPATIENT
Start: 2021-02-23 | End: 2021-02-23

## 2021-02-22 RX ORDER — HYDROXYZINE HYDROCHLORIDE 25 MG/1
25-50 TABLET, FILM COATED ORAL EVERY 4 HOURS PRN
Status: DISCONTINUED | OUTPATIENT
Start: 2021-02-22 | End: 2021-02-23

## 2021-02-22 RX ADMIN — METOCLOPRAMIDE 10 MG: 10 TABLET ORAL at 17:44

## 2021-02-22 RX ADMIN — PRENATAL VITAMINS-IRON FUMARATE 27 MG IRON-FOLIC ACID 0.8 MG TABLET 1 TABLET: at 17:44

## 2021-02-22 RX ADMIN — ESCITALOPRAM OXALATE 20 MG: 20 TABLET ORAL at 19:53

## 2021-02-22 RX ADMIN — HYDROXYZINE HYDROCHLORIDE 25 MG: 25 TABLET, FILM COATED ORAL at 19:53

## 2021-02-22 RX ADMIN — METOCLOPRAMIDE 10 MG: 10 TABLET ORAL at 19:53

## 2021-02-22 ASSESSMENT — ENCOUNTER SYMPTOMS
FLANK PAIN: 0
NERVOUS/ANXIOUS: 1
FEVER: 0
COUGH: 0
DYSURIA: 0
FREQUENCY: 0

## 2021-02-22 ASSESSMENT — ACTIVITIES OF DAILY LIVING (ADL)
HYGIENE/GROOMING: INDEPENDENT
WALKING_OR_CLIMBING_STAIRS_DIFFICULTY: NO
DIFFICULTY_COMMUNICATING: NO
TOILETING_ISSUES: NO
CONCENTRATING,_REMEMBERING_OR_MAKING_DECISIONS_DIFFICULTY: YES
LAUNDRY: WITH SUPERVISION
DOING_ERRANDS_INDEPENDENTLY_DIFFICULTY: NO
ORAL_HYGIENE: INDEPENDENT
FALL_HISTORY_WITHIN_LAST_SIX_MONTHS: NO
WEAR_GLASSES_OR_BLIND: NO
DIFFICULTY_EATING/SWALLOWING: NO
DRESS: INDEPENDENT
DRESSING/BATHING_DIFFICULTY: NO

## 2021-02-22 NOTE — PROGRESS NOTES
02/22/21 1713   Patient Belongings   Did you bring any home meds/supplements to the hospital?  No   Patient Belongings locker  (Locker #4)   Patient Belongings Put in Hospital Secure Location (Security or Locker, etc.) cell phone/electronics;clothing;shoes;plastic bag   Belongings Search Yes   Clothing Search Yes   Second Staff Clothing and patient search conducted by female staff during day shift. Belongings inventoried during evening shift.       Patient Belongings Kept in Locker #4:  Clothing (tennis shoes, fabric facemask, socks, bra, long sleeve red shirt, black leggings).  Charging box and cord.  Cell phone.  Hair tie.     **Patient arrived on unit with ZERO valuable items required to be sent to security (i.e., cash/credit cards, medications, checks, ID cards, wallet, purse, etc).**    A               Admission:  I am responsible for any personal items that are not sent to the safe or pharmacy.  [facility] is not responsible for loss, theft or damage of any property in my possession.    Signature:  _________________________________ Date: _______  Time: _____                                              Staff Signature:  ____________________________ Date: ________  Time: _____      2nd Staff person, if patient is unable/unwilling to sign:    Signature: ________________________________ Date: ________  Time: _____     Discharge:  [facility] has returned all of my personal belongings:    Signature: _________________________________ Date: ________  Time: _____                                          Staff Signature:  ____________________________ Date: ________  Time: _____          Fitbit watch

## 2021-02-22 NOTE — PLAN OF CARE
"Patient is admitted to [facility] [address] for increased anxiety and suicidal thoughts.      Per patient this is her first admission.  Reports generally, happy, confident, and independent person.  Patient is six months pregnant.  Patient reports anxiety and panic attacks during the pregnancy. Symptoms include chest pain, brain fog, bad concentration, coughing and vomiting.  Prescribed medications are not successful as they make the patient excessively tired.  Patient reports taking a leave of absence due to her symptoms.      Patient is assessed during the admission interview.  Patients affect is blunted and flat.  Mood is depressed, anxious, and tearful.  Speech is clear and coherent.  Thoughts are logical.  Patient self reports brain fog and bad concentration.  Patient states when her panic attacks are severe, she has suicidal thoughts.  Denies wishing that she is dead.  Is seeking help to \"get back to the person I was.\"    The admission folder discussed.  Patient allegiers and home medications are reviewed.  On call provider is called and updated on the new admission. New orders are received.  Changes to patient home medications: Lorazepam 0.5-1 mg to be given once, tomorrow in the am per patient home schedule.  The provider will review this order with the patient tomorrow.   Patient voluntary,suicidal precautions and status 15.  All forms are signed.  Patient denies any questions or concerns at this time.   "

## 2021-02-22 NOTE — SAFE
Katy Purcell  February 22, 2021    Patient is 6.5 months pregnant, has a lot of shame about circumstances, has only known father, [place], about 8 months. Has repeatedly threatened suicide in past several days, after finding she wasn't able to function at her job after taking a 5 week ABBY. Declined IOP referral from last week. Is voluntary. Has active intent to suicide by OD on prescribed medications, but can contract for safety in hospital. Is likely to have second thoughts about admission, due to desire to work out all emotional issues independently. No history of hospitalizations and only recently sought help from a therapist.      Current Suicidal Ideation/Self-Injurious Concerns/Methods: Ingestion overdose on prescribed meds    Inappropriate Sexual Behavior: No    Aggression/Homicidal Ideation: None - N/A      For additional details see full DEC assessment.       Doc Garcia

## 2021-02-22 NOTE — TELEPHONE ENCOUNTER
Pt brought to [facility] by sister  B: pt worsening depression many months.  Pt became pregnant during an online relationship. Father is in [place] and patient has guilt of being single and pregnant.  Pt worsening mood, decrease sleep,crying bouts, calling family all weekend threatening suicide.  Pt in [facility] tearful, withdrawn.  Pt 6 1/2 months pregnant, denies drugs, utox negative. Asymptomatic, test pending  A: depression. Calm, cooperative, vol.  R: naegele/30  Patient cleared and ready for behavioral bed placement: Yes

## 2021-02-22 NOTE — PHARMACY-ADMISSION MEDICATION HISTORY
Admission Medication History Completed by Pharmacy    See [facility] Admission Navigator for allergy information, preferred outpatient pharmacy, prior to admission medications and immunization status.     Medication history sources:  patient interview via phone, SureScripts dispense history    Changes made to PTA medication list (reason)  Added: N/A  Deleted: N/A  Changed:   - escitalopram-->HS    Additional medication history information:   - Patient denies taking any additional Rx/OTC medications other than the ones listed below.    Prior to Admission medications    Medication Sig Last Dose Taking? Auth Provider   escitalopram (LEXAPRO) 20 MG tablet Take 20 mg by mouth At Bedtime 2/21/2021 Yes Unknown, Entered By History   hydrOXYzine (VISTARIL) 25 MG capsule Take 1-2 capsules by mouth as needed for anxiety PRN Yes Reported, Patient   LORazepam (ATIVAN) 0.5 MG tablet Take 0.5-1 mg by mouth daily as needed for anxiety  2/22/2021 Yes Reported, Patient   metoclopramide (REGLAN) 10 MG tablet Take 10 mg by mouth 4 times daily (before meals and nightly) 2/21/2021 Yes Reported, Patient   Prenatal MV-Min-Fe Fum-FA-DHA (PRENATAL 1 PO) Take 1 tablet by mouth daily 2/22/2021 Yes Reported, Patient     Date completed: 02/22/21    Medication history completed by:   Luis Angel Segundo, PharmD, BCPS  [facility]: Ascom *[id number] or [phone]

## 2021-02-22 NOTE — ED PROVIDER NOTES
[facility] ([facility])     February 22, 2021  History     Chief Complaint   Patient presents with     Anxiety     Pt marky here last Thursday and was told to go to day treatment and increase 1 of her prescriptions.  Pt states she can not get into day treatment because there is a wait list and they do not know when she can get i9n.  Has an appointment with psychiatist on March 4  per recomendation by her OB MD.       Suicidal     States she has thoughts but here to get help, has plan to OD on her meds..   Pt is about 6 months pregnaant.  OB MD started her on 0.5 mg atiivan but states it took too long to kick in and then she got really sleepy.     HPI  Katy Purcell is a 23 weeks pregnant 35 year old female with a PMH of anxiety who presents to the ED today complaining of anxiety. She was seen here 4 days ago for her anxiety.  Patient was evaluated and it was determined that she would start on some Lexapro and start in outpatient treatment.  Patient is somewhat frustrated as she has been talking with outpatient treatment but it is not certain when she can get in, and she has an appointment with a psychiatrist on March 3 or 4 but needs help before then.  Has no fevers or coughing, has no UTI symptoms and states she has no medical complaints other than her changes with pregnancy and her current anxiety.  Patient denies any suicidal or homicidal ideation and presents to the ER for reevaluation.    Patient was recently seen here in the ED on 2/18/2021 complaining of anxiety. She had gotten unexpectantly pregnant with her boyfriend.  Her Lexapro was increased to 20 mg once a day, at nighttime.  Her hydroxyzine at bedtime was continued. She will follow up with her therapist.  She was scheduled with an assessment for day treatment the next morning to give her more intensive work on her high anxiety.       I have reviewed the Medications, Allergies, Past Medical and Surgical History, and Social  History in the [facility] system.    PAST MEDICAL HISTORY:   Past Medical History:   Diagnosis Date     Anxiety        PAST SURGICAL HISTORY: History reviewed. No pertinent surgical history.    Past medical history, past surgical history, medications, and allergies were reviewed with the patient. Additional pertinent items: None    FAMILY HISTORY: No family history on file.    SOCIAL HISTORY:   Social History     Tobacco Use     Smoking status: Never Smoker     Smokeless tobacco: Never Used   Substance Use Topics     Alcohol use: Not Currently     Social history was reviewed with the patient. Additional pertinent items: None      Patient's Medications   New Prescriptions    No medications on file   Previous Medications    ESCITALOPRAM (LEXAPRO) 20 MG TABLET    Take 1 tablet (20 mg) by mouth daily    HYDROXYZINE (VISTARIL) 25 MG CAPSULE    Take 1-2 capsules by mouth as needed for anxiety    LORAZEPAM (ATIVAN) 0.5 MG TABLET    Take 0.5 mg by mouth as needed for anxiety    METOCLOPRAMIDE (REGLAN) 10 MG TABLET    Take 10 mg by mouth 4 times daily (before meals and nightly)    PRENATAL MV-MIN-FE FUM-FA-DHA (PRENATAL 1 PO)    Take 1 tablet by mouth daily   Modified Medications    No medications on file   Discontinued Medications    No medications on file          Allergies   Allergen Reactions     Benzoyl Peroxide      PN: LW Reaction: HIVES        Review of Systems   Constitutional: Negative for fever.   Respiratory: Negative for cough.    Genitourinary: Negative for dysuria, flank pain, frequency and urgency.   Psychiatric/Behavioral: Negative for suicidal ideas. The patient is nervous/anxious (anxiety).    All other systems reviewed and are negative.    A complete review of systems was performed with pertinent positives and negatives noted in the HPI, and all other systems negative.    Physical Exam   BP: 123/64  Pulse: 85  Temp: 97  F (36.1  C)  Resp: 16  SpO2: 97 %  /64   Pulse 85   Temp 97  F (36.1  C) (Oral)    Resp 16   LMP 09/04/2020 (Within Days)   SpO2 97%   Breastfeeding No       Physical Exam  Vitals signs and nursing note reviewed.   Constitutional:       Comments: Conversant pleasant but slightly tearful   Eyes:      Extraocular Movements: Extraocular movements intact.      Pupils: Pupils are equal, round, and reactive to light.   Neck:      Musculoskeletal: Neck supple.   Abdominal:      Palpations: Abdomen is soft.      Comments: Nontender    Fetal heart tones 160 by nursing personnel   Skin:     General: Skin is warm.   Neurological:      General: No focal deficit present.      Mental Status: She is alert and oriented to person, place, and time.      Comments: Grossly intact and symmetric   Psychiatric:      Comments: Tearful         ED Course   9:25 AM  The patient was seen and examined by Vishal Powell MD in Room HW02.     No labs were done as the patient had a full work-up last week.     Procedures            Assessments & Plan (with Medical Decision Making)     I have reviewed the nursing notes.    At this time the patient will be moved to our behavioral medicine department for further evaluation and treatment.        Working diagnoses:   Generalized anxiety disorder   24 weeks gestation of pregnancy     Vishal Powell MD, MD    Gonzalo DOUGLAS, am serving as a trained medical scribe to document services personally performed by Vishal Powell Md, MD, based on the provider's statements to me.     IVishal Md, MD, was physically present and have reviewed and verified the accuracy of this note documented by Gonzalo Cantu.      2/22/2021   [facility] EMERGENCY DEPARTMENT     Vishal Powell MD  02/22/21 5571

## 2021-02-22 NOTE — TELEPHONE ENCOUNTER
S: Pt's mother called to provide collateral at 7:25PM.    B: Pt lives by herself; she is 5 months pregnant.  Pt has a hx of depression and very high anxiety.  Pt some times thinks to end it all by taking all of medications at once.  Pt was previously assessed in the [facility] ED and discharged to home.  Family is very concerned and cannot keep an eye on Pt at all times.  Pt has difficulty coping with her depression and anxiety and is afraid to be alone.  Pt is compliant with medications, but some times think about taking the whole bottle to get it over with.  Mom is concerned and want Pt to be admitted for mh inpt.   explained that Pt would need to be assessed again and a safe plan will be determined.      A: Pt will be going to the [facility] ED.    R: Pt is to be evaluated in the ED for mh inpt/safe planning.

## 2021-02-22 NOTE — PROGRESS NOTES
Patient arrived on unit at 1510. Search was completed. Was oriented to unit. Admitting nurse arrived and brought patient to interview room to begin admission process.

## 2021-02-22 NOTE — ED PROVIDER NOTES
"ED Provider Note  [facility]      History     Chief Complaint   Patient presents with     Anxiety     Pt marky here last Thursday and was told to go to day treatment and increase 1 of her prescriptions.  Pt states she can not get into day treatment because there is a wait list and they do not know when she can get i9n.  Has an appointment with psychiatist on March 4  per recomendation by her OB MD.       Suicidal     States she has thoughts but here to get help, has plan to OD on her meds..   Pt is about 6 months pregnaant.  OB MD started her on 0.5 mg atiivan but states it took too long to kick in and then she got really sleepy.     HPI  Katy Purcell is a 35 year old female who presents for mental health evaluation.  Please see documentation from her previous visit on February 18, and also documentation from Dr. Powell from earlier today.  Patient is known to be in the late second trimester of pregnancy.  Apparently this pregnancy was unexpected.  She initially presented on February 18 complaining of severe anxiety and multiple somatic symptoms.  She had a medical evaluation which was unremarkable.  She was ultimately discharged with increased dosage of Lexapro and referrals for outpatient therapy.  She went to the intake and day treatment and was told that it would be \"a few weeks\" before she could get into day treatment.  She felt that this was not going to be acceptable, also was not sure that the program was a fit for her.  Reportedly the  they recommended she consider the mom and baby program as an outpatient as an alternative.  She subsequently followed up with her OB/GYN who consulted with an outside psychiatrist at [facility].  She was started on a low-dose of lorazepam and the psychiatrist also recommended the mother-baby program.  Patient states since starting lorazepam she is just tired and does not feel any better.  She reports frequent episodes of crying " tearfulness and suicidal thoughts with consideration to overdose on her medications.  She is called her sister and her mother several times a day crying stating that she cannot go on and may commit suicide if she does not get help.  She currently denies any medical symptoms, states she experiences normal fetal movement, does not any discharge, bleeding or leakage of fluid and is not experiencing any abdominal pain or other symptoms.    Past Medical History  Past Medical History:   Diagnosis Date     Anxiety      History reviewed. No pertinent surgical history.  escitalopram (LEXAPRO) 20 MG tablet  hydrOXYzine (VISTARIL) 25 MG capsule  LORazepam (ATIVAN) 0.5 MG tablet  metoclopramide (REGLAN) 10 MG tablet  Prenatal MV-Min-Fe Fum-FA-DHA (PRENATAL 1 PO)      Allergies   Allergen Reactions     Benzoyl Peroxide      PN: LW Reaction: HIVES     Family History  No family history on file.  Social History   Social History     Tobacco Use     Smoking status: Never Smoker     Smokeless tobacco: Never Used   Substance Use Topics     Alcohol use: Not Currently     Drug use: Never      Past medical history, past surgical history, medications, allergies, family history, and social history were reviewed with the patient. No additional pertinent items.       Review of Systems  A complete review of systems was performed with pertinent positives and negatives noted in the HPI, and all other systems negative.    Physical Exam   BP: 123/64  Pulse: 85  Temp: 97  F (36.1  C)  Resp: 16  SpO2: 97 %  Physical Exam  Vitals signs and nursing note reviewed.   Constitutional:       General: She is not in acute distress.     Appearance: She is not diaphoretic.   HENT:      Head: Atraumatic.      Mouth/Throat:      Pharynx: No oropharyngeal exudate.   Eyes:      General: No scleral icterus.     Pupils: Pupils are equal, round, and reactive to light.   Cardiovascular:      Heart sounds: Normal heart sounds.   Pulmonary:      Effort: No respiratory  distress.      Breath sounds: Normal breath sounds.   Abdominal:      General: Bowel sounds are normal.      Palpations: Abdomen is soft.      Tenderness: There is no abdominal tenderness.      Comments: Abdomen is gravid and nontender.   Musculoskeletal:         General: No tenderness.   Skin:     General: Skin is warm.      Findings: No rash.   Neurological:      General: No focal deficit present.      Mental Status: She is oriented to person, place, and time.   Psychiatric:         Attention and Perception: Attention normal.         Mood and Affect: Mood is depressed. Affect is flat.         Speech: Speech normal.         Behavior: Behavior normal.         Thought Content: Thought content includes suicidal ideation. Thought content includes suicidal plan.         Cognition and Memory: Cognition normal.         Judgment: Judgment normal.         ED Course      Procedures                         Results for orders placed or performed during the hospital encounter of 02/22/21   Drug abuse screen 6 urine (tox)     Status: None   Result Value Ref Range    Amphetamine Qual Urine Negative NEG^Negative    Barbiturates Qual Urine Negative NEG^Negative    Benzodiazepine Qual Urine Negative NEG^Negative    Cannabinoids Qual Urine Negative NEG^Negative    Cocaine Qual Urine Negative NEG^Negative    Ethanol Qual Urine Negative NEG^Negative    Opiates Qualitative Urine Negative NEG^Negative   HCG qualitative urine     Status: Abnormal   Result Value Ref Range    HCG Qual Urine Positive (A) NEG^Negative     Medications - No data to display     Assessments & Plan (with Medical Decision Making)   A 35-year-old woman who is now 23 weeks pregnant presenting to the ED for the second time in 5 days due to severe anxiety, depressive symptoms including hopelessness, helplessness and anhedonia, and severe anxiety.  Reports episodes of crying, hopelessness and suicidal thoughts specifically with consideration of overdose.  The patient  was also seen by the [facility] , please refer to their extensive note/evaluation which was reviewed with me and is documented in EPIC on 2/22/2021 for further details.  Patient is not certain she could contract for safety.  Here she is depressed and flat appearing.  Recommendation is being made for admission and she will consent.  Attempts at outpatient referrals and medication adjustments to date have been unsuccessful and she may represent a risk of harm to herself.  Has been evaluated by her obstetrician within the last 72 hours and does not appear to have any acute concerns with respect to the pregnancy.  We will plan for voluntary mental health admission.    I have reviewed the nursing notes. I have reviewed the findings, diagnosis, plan and need for follow up with the patient.    New Prescriptions    No medications on file       Final diagnoses:   Generalized anxiety disorder   24 weeks gestation of pregnancy   Current moderate episode of major depressive disorder, unspecified whether recurrent (H)   Suicidal ideation       --  Meir Almonte MD  [facility] EMERGENCY DEPARTMENT  2/22/2021     Meir Almonte MD  02/22/21 0336

## 2021-02-22 NOTE — ED NOTES
Pt changed how she thought after she was triaged and staed she was suicidal with plan to right eye on her medications.

## 2021-02-23 LAB
ALBUMIN SERPL-MCNC: 2.8 G/DL (ref 3.4–5)
ALP SERPL-CCNC: 74 U/L (ref 40–150)
ALT SERPL W P-5'-P-CCNC: 16 U/L (ref 0–50)
ANION GAP SERPL CALCULATED.3IONS-SCNC: 8 MMOL/L (ref 3–14)
AST SERPL W P-5'-P-CCNC: 12 U/L (ref 0–45)
BILIRUB SERPL-MCNC: 0.4 MG/DL (ref 0.2–1.3)
BUN SERPL-MCNC: 10 MG/DL (ref 7–30)
CALCIUM SERPL-MCNC: 8.7 MG/DL (ref 8.5–10.1)
CHLORIDE SERPL-SCNC: 104 MMOL/L (ref 94–109)
CHOLEST SERPL-MCNC: 291 MG/DL
CO2 SERPL-SCNC: 24 MMOL/L (ref 20–32)
CREAT SERPL-MCNC: 0.49 MG/DL (ref 0.52–1.04)
GFR SERPL CREATININE-BSD FRML MDRD: >90 ML/MIN/{1.73_M2}
GLUCOSE SERPL-MCNC: 119 MG/DL (ref 70–99)
HDLC SERPL-MCNC: 99 MG/DL
LDLC SERPL CALC-MCNC: 162 MG/DL
NONHDLC SERPL-MCNC: 192 MG/DL
POTASSIUM SERPL-SCNC: 4.2 MMOL/L (ref 3.4–5.3)
PROT SERPL-MCNC: 6.6 G/DL (ref 6.8–8.8)
SODIUM SERPL-SCNC: 136 MMOL/L (ref 133–144)
TRIGL SERPL-MCNC: 148 MG/DL
TSH SERPL DL<=0.005 MIU/L-ACNC: 1.42 MU/L (ref 0.4–4)

## 2021-02-23 PROCEDURE — 250N000013 HC RX MED GY IP 250 OP 250 PS 637: Performed by: PSYCHIATRY & NEUROLOGY

## 2021-02-23 PROCEDURE — 250N000013 HC RX MED GY IP 250 OP 250 PS 637: Performed by: CLINICAL NURSE SPECIALIST

## 2021-02-23 PROCEDURE — G0177 OPPS/PHP; TRAIN & EDUC SERV: HCPCS

## 2021-02-23 PROCEDURE — 80053 COMPREHEN METABOLIC PANEL: CPT | Performed by: CLINICAL NURSE SPECIALIST

## 2021-02-23 PROCEDURE — 36415 COLL VENOUS BLD VENIPUNCTURE: CPT | Performed by: CLINICAL NURSE SPECIALIST

## 2021-02-23 PROCEDURE — 80061 LIPID PANEL: CPT | Performed by: CLINICAL NURSE SPECIALIST

## 2021-02-23 PROCEDURE — 99223 1ST HOSP IP/OBS HIGH 75: CPT | Mod: AI | Performed by: PSYCHIATRY & NEUROLOGY

## 2021-02-23 PROCEDURE — 84443 ASSAY THYROID STIM HORMONE: CPT | Performed by: CLINICAL NURSE SPECIALIST

## 2021-02-23 PROCEDURE — 124N000002 HC R&B MH UMMC

## 2021-02-23 RX ORDER — BUSPIRONE HYDROCHLORIDE 5 MG/1
5 TABLET ORAL 3 TIMES DAILY
Status: DISCONTINUED | OUTPATIENT
Start: 2021-02-23 | End: 2021-02-24 | Stop reason: HOSPADM

## 2021-02-23 RX ORDER — LORAZEPAM 0.5 MG/1
0.25 TABLET ORAL DAILY PRN
Status: DISCONTINUED | OUTPATIENT
Start: 2021-02-23 | End: 2021-02-24 | Stop reason: HOSPADM

## 2021-02-23 RX ORDER — HYDROXYZINE HCL 25 MG
12.5-25 TABLET ORAL 3 TIMES DAILY PRN
Status: DISCONTINUED | OUTPATIENT
Start: 2021-02-23 | End: 2021-02-24 | Stop reason: HOSPADM

## 2021-02-23 RX ORDER — HYDROXYZINE HYDROCHLORIDE 25 MG/1
25-50 TABLET, FILM COATED ORAL 3 TIMES DAILY PRN
Status: DISCONTINUED | OUTPATIENT
Start: 2021-02-23 | End: 2021-02-23

## 2021-02-23 RX ADMIN — ESCITALOPRAM OXALATE 20 MG: 20 TABLET ORAL at 20:09

## 2021-02-23 RX ADMIN — BUSPIRONE HYDROCHLORIDE 5 MG: 5 TABLET ORAL at 20:09

## 2021-02-23 RX ADMIN — METOCLOPRAMIDE 10 MG: 10 TABLET ORAL at 20:09

## 2021-02-23 RX ADMIN — METOCLOPRAMIDE 10 MG: 10 TABLET ORAL at 08:20

## 2021-02-23 RX ADMIN — METOCLOPRAMIDE 10 MG: 10 TABLET ORAL at 17:33

## 2021-02-23 RX ADMIN — BUSPIRONE HYDROCHLORIDE 5 MG: 5 TABLET ORAL at 14:34

## 2021-02-23 RX ADMIN — PRENATAL VITAMINS-IRON FUMARATE 27 MG IRON-FOLIC ACID 0.8 MG TABLET 1 TABLET: at 08:20

## 2021-02-23 RX ADMIN — LORAZEPAM 0.5 MG: 0.5 TABLET ORAL at 10:02

## 2021-02-23 RX ADMIN — METOCLOPRAMIDE 10 MG: 10 TABLET ORAL at 11:54

## 2021-02-23 RX ADMIN — Medication 25 MG: at 20:21

## 2021-02-23 ASSESSMENT — ACTIVITIES OF DAILY LIVING (ADL)
ORAL_HYGIENE: INDEPENDENT
HYGIENE/GROOMING: INDEPENDENT
DRESS: INDEPENDENT
HYGIENE/GROOMING: INDEPENDENT
DRESS: INDEPENDENT
LAUNDRY: WITH SUPERVISION
ORAL_HYGIENE: INDEPENDENT
LAUNDRY: WITH SUPERVISION

## 2021-02-23 NOTE — PLAN OF CARE
"    The patient and/or their representative will achieve their patient-specific goals related to the plan of care.    The patient-specific goals include:     \"Reasons you are in the hospital;\" The patient identifies the following reasons for current hospitalization:   \"high anxiety\"  \"can't control panic attacks and bad feelings  \"loss of concentration and add fatigue from pills\"  \"all coping tools I know of aren't working\"      \"Goals for Discharge\" The patient identifies the following goals for discharge:  \"to control anxiety\"  \"be able to function like I used to\"  \"to get my medication figured out\"    "

## 2021-02-23 NOTE — PROGRESS NOTES
"CLINICAL NUTRITION SERVICES - ASSESSMENT NOTE     Nutrition Prescription    RECOMMENDATIONS FOR MDs/PROVIDERS TO ORDER:  None at this time.    Malnutrition Status:    Unable to determine due to unable to complete NFPE    Recommendations already ordered by Registered Dietitian (RD):  Order chocolate Boost once daily with lunch.    Future/Additional Recommendations:  Monitor PO intake, supplement use, and weights     REASON FOR ASSESSMENT  Katy Purcell is a/an 35 year old female assessed by the dietitian for Provider Order - nausea and vomiting.    PMH: anixety and suicidal ideation    NUTRITION HISTORY  - Patient reports she her appetite has been good, and has been incorporating snacks into her day to increase intake for pregnancy. She has not concerns regarding meeting her needs prior to hospitalization.    CURRENT NUTRITION ORDERS  Diet: Regular  Intake/Tolerance: Patient reports decreased appetite 2/2 anxiety over the past 2 weeks. However, patient states she forces herself to eat, so does not report decreased intake and believes she is still meeting her needs. Denies nausea and vomiting. Has no nutrition-related concerns at this time. Is interested in receiving chocolate Boost daily to meet needs.    LABS  Labs reviewed    MEDICATIONS  Medications reviewed  ativan, reglan, prenatal MVI w/ iron  PRN: zyprexa    ANTHROPOMETRICS  Height: 165.1 cm (5' 5\") - per CE 11/06/20  Most Recent Weight: 74.4 kg (164 lb)    Pre-pregnancy weight: 63 kg  Pre-pregnancy BMI: Normal  Total Recommended Weight Gain for BMI: 11-16 kg  Weight History: Patient's pregnancy weight gain within recommended range at ~12 kg at 6 months. Of note, patient lost 5 lbs (3%) in one week, though difficult to assess based on fluctuations from 12/12-12/23.  Wt Readings from Last 10 Encounters:   02/23/21 74.4 kg (164 lb)   02/18/21 76.7 kg (169 lb)   02/12/21 75.3 kg (166 lb)  01/29/21 74.4 kg (164 lb)  01/15/21 74.1 kg (163 lb)  12/18/20 67.9 kg " (149 lb)  11/30/20 64.9 kg (143 lb)  11/06/20 61.2 kg (135 lb)  03/17/20 62.6 kg (137 lb)    Dosing Weight: 63 kg (pre-pregnancy weight)    ASSESSED NUTRITION NEEDS  Estimated Energy Needs: 5153-2280 kcals/day (25 - 30 kcals/kg) + 340 kcal for pregnancy  Justification: Maintenance, 2nd trimester pregnancy  Estimated Protein Needs: 74-89 grams protein/day (1 - 1.2 grams of pro/kg)  Justification: Maintenance  Estimated Fluid Needs: 4972-8808 mL/day (25 - 30 mL/kg)   Justification: Maintenance    PHYSICAL FINDINGS  See malnutrition section below.    MALNUTRITION  % Intake: Decreased intake does not meet criteria  % Weight Loss: Difficult to assess  Subcutaneous Fat Loss: Unable to assess  Muscle Loss: Unable to assess  Fluid Accumulation/Edema: None noted  Malnutrition Diagnosis: Unable to determine due to unable to complete NFPE    NUTRITION DIAGNOSIS  Predicted inadequate nutrient intake (energy/protien) related to increased needs with pregnancy and patient report of anxiety affecting appetite.     INTERVENTIONS  Implementation  Nutrition Education: discussed increased needs during pregnancy and using supplements to help meet needs.  Medical food supplement therapy - see recs above    Goals  Patient to consume % of nutritionally adequate meal trays TID, or the equivalent with supplements/snacks.     Monitoring/Evaluation  Progress toward goals will be monitored and evaluated per protocol.    Tresa Pop   Dietetic Intern

## 2021-02-23 NOTE — PLAN OF CARE
BEHAVIORAL TEAM DISCUSSION    Participants:   Dr. Cabrera, Maryann STOUT, Paige Reyes RN      Progress:   Pt presented to the [facility] ER  BIB sister Shruthi for anxiety and suicidal ideation with thoughts of overdosing on medication.     There is conflicting information in the same ER note about her suicidal ideation.   Patient denies any suicidal or homicidal ideation and presents to the ER for reevaluation.     Reports episodes of crying, hopelessness and suicidal thoughts specifically with consideration of overdose.        Drug test is negative.         Anticipated length of stay:   1 week    Continued Stay Criteria/Rationale:  The pt needs further evaluation of safety risk to self.    Medical/Physical:   23 weeks pregnant - unexpected pregnancy  no medical complaints   Pt asked for a humidifier or something to help her breath. She was directed to prop up pillows.       Precautions:   Behavioral Orders   Procedures     Code 1 - Restrict to Unit     Discontinue 1:1 attendant for suicide risk     Order Specific Question:   I have performed an in person assessment of the patient     Answer:   Based on this assessment the patient no longer requires a one on one attendant at this point in time.     Order Specific Question:   Rationale     Answer:   Patient States able to remain safe in hospital     Routine Programming     As clinically indicated     Status 15     Every 15 minutes.     Suicide precautions     Patients on Suicide Precautions should have a Combination Diet ordered that includes a Diet selection(s) AND a Behavioral Tray selection for Safe Tray - with utensils, or Safe Tray - NO utensils       Plan:   Multidisciplinary evaluation  Medication Management in context of pregnancy  Encourage group programming  Schedule outpatient services      Rationale for change in precautions or plan: initial review

## 2021-02-23 NOTE — PLAN OF CARE
Initial Psychosocial Assessment    I have reviewed the chart, met with the patient, and developed Care Plan.  Information for assessment was obtained from:     ASked pt to sign THOMAS  And she said she already did.  I looked in legal section and she signed one for sister, mother and boyfriend Jose Alfredo Deras.  However, it is written in a way that was not complete and not assisted by staff.    Presenting Problem:  Pt presented to the [facility] ER  BIB sister Shruthi for anxiety and suicidal ideation with thoughts of overdosing on medication.    There is conflicting information in the same ER note about her suicidal ideation.   Patient denies any suicidal or homicidal ideation and presents to the ER for reevaluation.    Reports episodes of crying, hopelessness and suicidal thoughts specifically with consideration of overdose.      Drug test is negative.      Medical  23 weeks pregnant - unexpected pregnancy  no medical complaints       History of Mental Health and Chemical Dependency:      Diagnoses  Anxiety  Depression  Panic Attacks  Adjustment DO  Generalized Anxiety DO  Other specified mental disorder due to another medical condition        Hospitalization  2/22/21 to present @ [facility] [address] - first admission   2/22/21 @ [facility] ER  2/18/21 @ [facility]  Pt was evaluated by [facility] OP  - HAILE Coker on 2/19/21.   discussed treatment options with patient stating that she is not interested in group outpatient programming and reports that she does not do well with virtual services.         Family Description (Constellation, Family Psychiatric History):    Paternal grandfather was hospitalized and diagnoses with schizophrenia.      Pt was born and raised in [place], [place].  Parents remained  until father's death 1.5 years ago.  Father was a .  Mother was initially a stay at  "home mother and then went to work at department stores for extra money to buy the kids things like a piano.  There were 4 children.  There is an older brother who is  with 4 children.  Pt and her twin bother are in the middle. Twin brother lives with mother who has an underlying condition.  Younger sister Shruthi lives with her boyfriend.      Pt reports \"a perfect childhood.\"    Pt has a twin brother who has depressoin. Family has considered the toptino of commitment for him.    Pt was in [place] for 9 years and came back to [place] to help her mother.      Patient is 6.5 months pregnant, has a lot of shame about circumstances, has only known father, who lives [place]state, about 8 months.   FOB lives in [place]. They met on a dating site and see each other every other weekend.  Jose Alfredo is 33 years of age and is reported to be healthy. He does not have other children with a different partner.  JILLIAN's family are nurses at [employer] and told him that the medications increase risk of birth defects. He doesn't want her to take the medications.         Pt calls mother and sister daily. She has declined the option to stay with them. They do not know how to support her.      Significant Life Events (Illness, Abuse, Trauma, Death):  Pt says \"nothing like that has ever happened\" to her.    Living Situation:  Pt lives alone in [place] [facility]    Educational Background:  Pt graduated from high school.  She obtained a Bachelor's degree in [employer] from [employer].  She then went to the [employer] and obtained a Bachelor's degree in fashion design.    Occupational History:  From [employer] @ [employer]  I wrote a letter for Katy to start a leave of absence from work.     She is working remotely currently.  Pt wasn't able to function at her job after taking a 5 week ABBY so extended the leave.   Employed in [employer] as a  with a company in [place] where she recently lived.  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      Financial Status:  Pt has [employer] for health insurance. I informed the pt about the [employer] benefit.    She is living off her savings.      Legal Issues:  None    Ethnic/Cultural Issues:  None    Spiritual Orientation:  Latter day     Service History:  None    Social Functioning (organization, interests):  Pt is doing the grocery shopping and errands for her mother.  Re; the pandemic, she is \"using precautions but still lives like normal.\"    Current Treatment Providers are:      Therapist  - Salina Goldman: Appt on Thursday, Feb 25, @11AM - video  [facility]  Central Scheduling @ [phone]  [facility]  [address]  [place] [address]  Direct to Clinic: [phone]         Psychiatry - Dr. Cabrera: Appt Thursday, March 4, 2021 @ 11AM, video  [facility]  [address],   [address]rd [place] [address]  Phone: [phone]  Fax: [phone]  If your provider wants the records from this hospitalization, please use the EP[employer] CareEverywhere system while at their office.          consider the mom and baby program as an outpatient as an alternative @ [facility]    Resources:  Mother Baby Program  [facility]  [address]  [address]  [place] [address]  For appointments call: [phone] ([facility])  Fax #: [phone]     [facility]  [address] [place], [place] [address]  [place], [place], [address] [phone]     [facility] [place] Office  [address] [place] [address]  Phone: [phone]  Fax: [phone]      03/05/2021 Appointment Obstetrics & Gynecology Citlaly Barr APRN, CNM    [address]    [place], [place] [address] [phone] (Fax)       03/23/2021 Appointment Obstetrics & Gynecology Chantal Rodriguez MD  " "  [address]    [place] [address]    [phone] (Fax)              Social Service Assessment/Plan:      Upon interview, pt has constricted affect. She feels that she does not belong here that the other patients \"are from a different walk of life.\"  She does not feel the groups are helping and she negrete snot want to be here long. She feels that the anxiety she is experiencing is hormonal and hopes it will go away once she delivers the baby. She seems to be looking forward to doing all the normal things with her baby.  She wants a Mother Baby Program. I will investigate the [facility] one and the [facility] one.    She wanted her meds looked at in the context of her pregnancy and she feels this has happened and she is pleased about this.      "

## 2021-02-23 NOTE — PLAN OF CARE
Pt actively participated in a structured Therapeutic Recreation group with a focus on leisure participation, stress reduction, and social engagement via an active group game. Pt remained focused and engaged throughout full duration of group.  Pt presented in a calm and quiet demeanor, but still was sociable with others. Pt was unfamiliar with the game, so often looked for some guidance throughout her turns. Pt was accepting of guidance from other patients and this writer. Pt was very active in the tossing large, foam dice for the activity.

## 2021-02-23 NOTE — H&P
Psychiatry History and Physical    Katy Purcell MRN# [id number]   Age: 35 year old YOB: 1985     Date of Admission:  2/22/2021          Assessment:   This patient is a 35 year old pregnant female with history of depression who presented to ED with active SI and plan to overdose on prescription medications in context of stressors related to pregnancy. Symptoms and presentation at this time is most consistent with below diagnoses. Lexapro was recently increased to 20 mg daily on 2/19. Patient agrees to continue to monitor response to the higher dose at this time as a switch in selective serotonin reuptake inhibitor at this time would likely be premature. She notes overall improvement in Ativan prn prescribed PTA, though does report sedation. She is concerned about this medication being Category D in pregnancy and would like to avoid if possible. Discussed multiple treatment options for temporarily relief of anxiety, including adding low dose hydroxyzine daily, adding Buspar or Gabapentin. Discussed treatment options with PharmD team as well. After discussion of R/B/A (she was also given educational handouts on pregnancy categories, R/B of medications), including risks in pregnancy and risks of untreated mental illness in pregnancy, patient elected to start a low dose of scheduled Buspar. We will monitor response closely. Currently, she denies SI and denies history of any intent. She is future oriented. She would benefit from CBT upon discharge, and is amenable with this plan. Inpatient psychiatric hospitalization is warranted at this time for safety, stabilization, and possible adjustment in medications.         Diagnoses:     MDD, recurrent, severe, with peripartum onset  Anxiety in pregnancy, antepartum  23 weeks pregnant         Plan:   Target psychiatric symptoms and interventions:  Resume PTA Lexapro 20 mg QHS (increased on 2/19 and switched to at bedtime due to perceived nausea)  Add Buspar 5  "mg TID to target anxiety  Resume hydroxyzine 12.5-25 mg TID prn for acute anxiety. PTA, patient was taking 25 mg at bedtime to target sleep disturbances with positive response  Resume prn Lorazepam though lower dose to 0.25 mg daily prn as pt experienced sedation at higher dose of 0.5 mg (active prescription was 0.5 mg-1 mg daily prn). She understands that this is a Category D medication and should only be utilized for severe anxiety despite alternative interventions.      Risks, benefits, and alternatives discussed at length with patient, including risks in pregnancy.     Medical Problems and Treatments:  Current pregnancy  - Resume prenatal vitamin    Routine labs reviewed. Remarkable for dyslipidemia and elevated fasting (?) glucose. Would consider IM consult.     Behavioral/Psychological/Social:  - Encourage unit programming    Safety:  - Safety precautions include: suicide precautions  - Continue precautions as noted above  - Status 15 minute checks      Legal Status: voluntary    Disposition Plan   Reason for ongoing admission: poses an imminent risk to self  Discharge location: home with family  Discharge Medications: not ordered  Follow-up Appointments: not scheduled    Entered by: Anastasiya Cabrera on 2/23/2021 at 8:23 AM            Chief Complaint:     \" I have a pretty perfect life so I do not know why I am feeling this way\"         History of Present Illness:     Per chart review, patient began reporting anxiety/depression concerns during an OBGYN appointment on 11/30/20. At that time, she reported low energy, fatigue. Patient was referred for therapy and declined medications at that time.     Due to ongoing anxiety and depression despite initiation of therapy, SSRIs were discussed during OBGYN appointment on 12/18/20, and Zoloft was initiated at dose of 25 mg daily with plan to increase to 50 mg daily after one week if tolerated.      Records from PCP appointment dated 1/12/21 indicate that patient " "experienced heart palpitations and SOB. She did not start Zoloft despite ongoing anxiety. Per PCP, \"She has a history of anxiety. Thinks this could be worsening. Not overly supported by SO with her anxiety diagnosis and actually has not started the daily medication for her anxiety due to some push back from SO. She has been taking the Atarax and that seems to help her anxiety and helps with her SOB.\"     At some point between 1/15 and 1/29 appointment with OBGYN, Zoloft was switched to Lexapro due to side effects from Zoloft. At 1/29 appointment, patient reported overall improvement in her anxiety after starting Lexapro 10 mg daily. She also continued prn hydroxyzine, primarily for sleep with noted improvement.     On 2/18, patient presented to [facility] ED with worsening anxiety in context of feeling \"stressed and overwhelmed.\" She reported that she \"can't live like this anymore,\" though denied any suicidal ideation. She reported fatigue, SOB, and nausea in the mornings. She was discharged to home and referred for day treatment programming.     On 2/19, patient saw OBGYN again with complaints of increased anxiety, somatic symptoms, and concerns about ineffectiveness of psychotropic medications. OBGYN provider consulted with Dr. Lopez in Psychiatry, and following changes were made: Lexapro was increased to 20 mg daily and Ativan 0.5-1 mg daily prn was added to target persistent  anxiety. She was also given a letter for a leave of absence from work.     Per DEC assessment: Patient again presented to [facility] ED on 2/22 with her sister. Patient reported that she is \"very suicidal with a plan to overdose on her prescription drugs\" according to DEC . Patient was interviewed for the day treatment program on 2/19, but then decided against it because she is uncomfortable disclosing her personal business to strangers.  Collateral was obtained from patient's sister.  Per DEC assessment, patient's sister reports that " patient has been very anxious, in tears all the time, and her suicide threats have become more serious since being in the emergency department on 2/18.  Patient lives alone; her baby's father lives in [place], and they only see each other every other weekend.  Patient seems to feel embarrassed about becoming pregnant with a man she is only known since July, and whom she met on a dating website.  Patient calls a sister and mother several times daily, is continually crying, expressing helplessness and hopelessness.  Patient tells this writer that she would commit suicide if she does not receive more help; she says her therapist implied she needed to be more assertive in expressing her needs when they last spoke.  Patient ideally wants to work with an individual provider more intensively, and to get a medication her medications which help her sleep, manage her anxiety without having fatigue and drowsiness constantly.  Patient took a 5-week leave from her job at the beginning of the year, due to anxiety.  She returned to work last week, and felt she was unable to function, had brain fog and was unable to focus.  On Friday of last week, her OB/GYN wrote her a letter for her employer, prescribing another medical leave.  Patient works remotely for a company in [place], and says her employer has been very supportive so far.  Patient was entered to hand accessing the mother-baby program at [facility], but writer's impression is that program would be less intensive than DTP and have the same disadvantages for patient has DTP, namely group engagement in virtual meeting format.  What patient is asking for, a daily opportunity to work individually with the provider and/or a psychiatrist, does not exist.  Patient is interested in accessing support and does not feel safe going home.  Sister says she and mother do not know how to support patient.  She has declined the option to stay with either of them, is  "agitated and hopeless, but indecisive about where to turn.  Patient has a twin brother, who also struggles with depression, about whom the family has considered commitment, and patient has asked family to \"commit\" her to the hospital.  Patient tells writer she is going to come into the hospital on a voluntary basis.    Upon my interview with the patient, she reports that she has not experienced significant anxiety until approximately 2 months ago in the context of pregnancy.  She added \"anxiety comes out of nowhere when there is no reason to be anxious.  It is overwhelming.  She stated that she had tried therapy, which was not helpful.  She then tried Zoloft, though \"I never got past the half dose because it was increasing my anxiety too much.\"  She stated that her anxiety is \"really bad in the mornings.  I coughed so much that I throw up.\"  She notes that overall morning sickness has improved in the past month or so, but the anxiety persists despite that.  She has taken prescribed Ativan on 3 different occasions, and she does feel that it is helpful at 0.5 mg, though she does experience side effect of sedation.  When asked about prompting factors for her anxiety, she said \"well pregnancy is a big one.  I feel sick every single day and I am unable to do what I normally do, and I cannot enjoy life.  It is so sad.  I cannot concentrate or focus on anything and typically I am someone who goes goes goes, and I get things done.\"  She added that despite her pregnancy concerns, \"I am super excited to meet my baby and I am hoping that everything will be back to normal.\"  She mentioned that she discussed her anxiety with her sister in law who had a similar experience, and her sister-in-law reassured her that the anxiety will improve once she gets birth.  This appears to have been still some hope in the patient.  She states that her relationship with her significant other is supportive.  She cannot identify any other " "prompting factors for worsening anxiety at this time.  Of note, CTC note indicates that patient's father passed away 1.5 years ago, though patient did not mention this during our interview.    With regards to her depression, she reports several episodes of depression in her past during which time she does experience fleeting passive suicidal thinking.  She noted that prior to this episode, she has never experienced active suicidal ideation.  She denies history of suicide attempts or self-harm.  She reported that she tells her mom everything, and she did experience one fleeting thought of active suicidal ideation with a plan to overdose on her prescription medications.  She added \"I would never do that though because I do not want to hurt my baby or my mom or my sister.  I am well aware that it would ruin their lives and I could never let any of them down.\"  When asked if she acted on her plan or had any intent on acting on her plan, the patient replied \"gosh no!\"  She feels that worsening depression is very much intertwined with worsening anxiety.  She currently denies suicidal ideation.  Her goal of hospitalization is to make medication adjustments and \"have a clear-cut plan.\"              Psychiatric Review of Systems:   Depression:   Reports: depressed mood, recent suicidal ideation though none currently, decreased interest, changes in sleep, changes in appetite, guilt, hopelessness, helplessness, impaired concentration, decreased energy, irritability.   Mabel:   Denies: sleeplessness, increased goal-directed activities, abrupt increase in energy, pressured speech  Psychosis:   Denies: visual hallucinations, auditory hallucinations, paranoia  Anxiety:   Reports: excessive worries that are difficult to control for the past 6 months  Denies: panic attacks  PTSD:   Denies: re-experiencing past trauma, nightmares, increased arousal, avoidance of traumatic stimuli, impaired function.  OCD:   Denies: obsessions, " "checking, symmetry, cleaning, skin picking.  ED:   Denies: restriction, binging, purging.           Medical Review of Systems:     Review of systems positive for shortness of breath intermittently, which she attributes to current pregnancy  10 point review of systems is otherwise negative unless noted above.            Psychiatric History:   Psychiatric Hospitalizations: No prior psychiatric hospitalizations  History of Psychosis: None  Prior ECT: None  Court Commitment: None  Suicide Attempts: None  Self-injurious Behavior: None  Violence toward others: None  Use of Psychotropics: Zoloft (worsening anxiety at a dose of 25 mg daily)         Substance Use History:   Alcohol: none.  Patient reports excessive alcohol use in her 20s.  She made a New Year's resolution to stop drinking alcohol entirely 4 years ago.  She denies history of any alcohol withdrawal.  Cannabis: none  Nicotine: none  Cocaine: none  Methamphetamine: none  Opiates/Heroin: none  Benzodiazepines: none  Hallucinogens: none  Inhalants: none      Prior Chemical Dependency treatment: none          Social History:   Upbringing: Pt was born and raised in [place], [place]. Parents remained  until father's death 1.5 years ago. Pt reports \"a perfect childhood.\"  Educational History: She obtained a Bachelor's degree in [employer] from [employer].She then went to the [employer] [place] [employer] and obtained a Bachelor's degree in fashion design.  Relationships:RIK lives in [place]. They met on a dating site and see each other every other weekend.  Jose Alfredo is 33 years of age and is reported to be healthy. He does not have other children with a different partner.  JILLIAN's family are nurses at [employer] and told him that the medications increase risk of birth defects. He doesn't want her to take the medications.   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  She is on a leave of absence from work.  Financial Support: Self  Legal History: None  Abuse/Trauma History: Patient denies         Family History:   Pt has a twin brother who has depressoin. Family has considered the option of commitment for him  Patient notes family history of depression on maternal and paternal side.  Paternal grandfather was hospitalized and diagnoses with schizophrenia.         Past Medical History:     Past Medical History:   Diagnosis Date     Anxiety               Past Surgical History:   History reviewed. No pertinent surgical history.           Allergies:      Allergies   Allergen Reactions     Benzoyl Peroxide      PN: LW Reaction: HIVES              Medications:   I have reviewed this patient's current medications  Medications Prior to Admission   Medication Sig Dispense Refill Last Dose     escitalopram (LEXAPRO) 20 MG tablet Take 20 mg by mouth At Bedtime   2/21/2021     hydrOXYzine (VISTARIL) 25 MG capsule Take 1-2 capsules by mouth as needed for anxiety   PRN     LORazepam (ATIVAN) 0.5 MG tablet Take 0.5-1 mg by mouth daily as needed for anxiety    2/22/2021     metoclopramide (REGLAN) 10 MG tablet Take 10 mg by mouth 4 times daily (before meals and nightly)   2/21/2021     Prenatal MV-Min-Fe Fum-FA-DHA (PRENATAL 1 PO) Take 1 tablet by mouth daily   2/22/2021             Labs:     Recent Results (from the past 24 hour(s))   Drug abuse screen 6 urine (tox)    Collection Time: 02/22/21 12:01 PM   Result Value Ref Range    Amphetamine Qual Urine Negative NEG^Negative    Barbiturates Qual Urine Negative NEG^Negative    Benzodiazepine Qual Urine Negative NEG^Negative    Cannabinoids Qual Urine Negative NEG^Negative    Cocaine Qual Urine Negative NEG^Negative    Ethanol Qual Urine Negative NEG^Negative    Opiates Qualitative Urine Negative NEG^Negative   HCG qualitative urine    Collection Time: 02/22/21 12:01 PM   Result Value Ref Range    HCG  "Qual Urine Positive (A) NEG^Negative   Asymptomatic SARS-CoV-2 COVID-19 Virus (Coronavirus) by PCR    Collection Time: 02/22/21  1:31 PM    Specimen: Nasopharyngeal   Result Value Ref Range    SARS-CoV-2 Virus Specimen Source Nasopharyngeal     SARS-CoV-2 PCR Result NEGATIVE     SARS-CoV-2 PCR Comment (Note)    Comprehensive metabolic panel    Collection Time: 02/23/21  7:29 AM   Result Value Ref Range    Sodium 136 133 - 144 mmol/L    Potassium 4.2 3.4 - 5.3 mmol/L    Chloride 104 94 - 109 mmol/L    Carbon Dioxide 24 20 - 32 mmol/L    Anion Gap 8 3 - 14 mmol/L    Glucose 119 (H) 70 - 99 mg/dL    Urea Nitrogen 10 7 - 30 mg/dL    Creatinine 0.49 (L) 0.52 - 1.04 mg/dL    GFR Estimate >90 >60 mL/min/[1.73_m2]    GFR Estimate If Black >90 >60 mL/min/[1.73_m2]    Calcium 8.7 8.5 - 10.1 mg/dL    Bilirubin Total 0.4 0.2 - 1.3 mg/dL    Albumin 2.8 (L) 3.4 - 5.0 g/dL    Protein Total 6.6 (L) 6.8 - 8.8 g/dL    Alkaline Phosphatase 74 40 - 150 U/L    ALT 16 0 - 50 U/L    AST 12 0 - 45 U/L   Lipid panel    Collection Time: 02/23/21  7:29 AM   Result Value Ref Range    Cholesterol 291 (H) <200 mg/dL    Triglycerides 148 <150 mg/dL    HDL Cholesterol 99 >49 mg/dL    LDL Cholesterol Calculated 162 (H) <100 mg/dL    Non HDL Cholesterol 192 (H) <130 mg/dL   TSH with free T4 reflex and/or T3 as indicated    Collection Time: 02/23/21  7:29 AM   Result Value Ref Range    TSH 1.42 0.40 - 4.00 mU/L       /78   Pulse 81   Temp 98.1  F (36.7  C) (Oral)   Resp 16   Wt 74.4 kg (164 lb)   LMP 09/04/2020 (Within Days)   SpO2 96%   Breastfeeding No   Weight is 164 lbs 0 oz  There is no height or weight on file to calculate BMI.         Psychiatric Mental Status Examination:   Appearance: awake, alert.  Tearful at times  Attitude: Evasive, cooperative  Eye Contact: good  Mood:  \" Anxious more than depressed\"  Affect: mood congruent and constricted, anxious  Speech:  clear, coherent and normal prosody  Language: fluent in " English  Psychomotor Behavior:  no evidence of tardive dyskinesia, dystonia, or tics  Gait/Station: normal  Thought Process:  linear, logical, goal oriented  Associations:  no loose associations  Thought Content:  Denying SI/HI/AVH; no evidence of psychotic thinking  Insight:   Fair  Judgement: intact  Oriented to:  time, person, and place  Attention Span and Concentration:  intact  Recent and Remote Memory:  intact  Fund of Knowledge: appropriate    Clinical Global Impressions  First:7   Most recent:7              Physical Exam:   Please refer to physical exam completed by ED provider, Vishal Powell MD, on 2/22/21. I agree with the findings and assessment and have no additional findings to add at this time.

## 2021-02-23 NOTE — PLAN OF CARE
Patient participated in some of project group and coping skills group. She was called out of each group for a significant amount of time, so received no charge. She was organized and focused in project group. She  selected a creative expression task and worked quietly. Writer began engaging patient about her interest in projects and she initially came across as stand offish: nicolas and irritable in tone and abrupt word choice. 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 She demonstrated interest in meditation group but it was in process and she was too late to join. She checked in writer about her project which was put away for her and shared an interest in relaxation groups.   During coping skills group patient appeared slightly more at ease. Some light smiling was observed through eyes at appropriate times, voice softer. She was attentive during activity and participated in discussion. She was called out of group several times but kept returning. She was attentive to speaker though and occasionally offered input but was mostly observational. She expressed interest in self-compassion as a group topic.

## 2021-02-23 NOTE — PLAN OF CARE
Patient has been out in the milieu for the start of the day. Ate breakfast and lunch. Attending groups. Patient stated that she felt less anxious after meeting with the psychiatrist because she was pondering if inpatient treatment was the right thing. States that she never dealt with anxiety before her pregnancy. Patient denies suicidal ideation and self injurious thoughts. Denies auditory and visual hallucinations. Med compliant. Affect is flat. Pleasant and cooperative on the unit.     Patient evaluation continues. Assessed mood,anxiety,thoughts and behavior.     Patient gradually progressing towards goals.    Patient is encouraged to participate in groups and assisted to develop healthy coping skills.     VS reviewed: /78   Pulse 81   Temp 98.1  F (36.7  C) (Oral)   Resp 16   Wt 74.4 kg (164 lb)   LMP 09/04/2020 (Within Days)   SpO2 96%   Breastfeeding No     Length of stay: 1    Refer to daily team meeting notes for individualized plan of care. Nursing will continue to assess.

## 2021-02-23 NOTE — PLAN OF CARE
Pt appears to have slept 7 hours this shift.  Pt currently awake in lounge playing a game with peers.  Pt offered no concerns or complaints and none noted. Will continue to monitor and support plan of care.

## 2021-02-24 VITALS
HEART RATE: 91 BPM | WEIGHT: 164 LBS | RESPIRATION RATE: 16 BRPM | DIASTOLIC BLOOD PRESSURE: 77 MMHG | SYSTOLIC BLOOD PRESSURE: 125 MMHG | TEMPERATURE: 98.2 F | OXYGEN SATURATION: 97 %

## 2021-02-24 PROCEDURE — 250N000013 HC RX MED GY IP 250 OP 250 PS 637: Performed by: PSYCHIATRY & NEUROLOGY

## 2021-02-24 PROCEDURE — 250N000013 HC RX MED GY IP 250 OP 250 PS 637: Performed by: CLINICAL NURSE SPECIALIST

## 2021-02-24 PROCEDURE — 99239 HOSP IP/OBS DSCHRG MGMT >30: CPT | Performed by: PSYCHIATRY & NEUROLOGY

## 2021-02-24 PROCEDURE — G0177 OPPS/PHP; TRAIN & EDUC SERV: HCPCS

## 2021-02-24 RX ORDER — LORAZEPAM 0.5 MG/1
0.25 TABLET ORAL DAILY PRN
Qty: 15 TABLET | Refills: 0 | Status: SHIPPED | OUTPATIENT
Start: 2021-02-24 | End: 2022-04-26

## 2021-02-24 RX ORDER — BUSPIRONE HYDROCHLORIDE 5 MG/1
5 TABLET ORAL 3 TIMES DAILY
Qty: 90 TABLET | Refills: 0 | Status: SHIPPED | OUTPATIENT
Start: 2021-02-24 | End: 2022-04-26

## 2021-02-24 RX ORDER — HYDROXYZINE HYDROCHLORIDE 25 MG/1
12.5-25 TABLET, FILM COATED ORAL 3 TIMES DAILY PRN
Qty: 60 TABLET | Refills: 0 | Status: SHIPPED | OUTPATIENT
Start: 2021-02-24 | End: 2022-04-26

## 2021-02-24 RX ORDER — ESCITALOPRAM OXALATE 20 MG/1
20 TABLET ORAL AT BEDTIME
Qty: 30 TABLET | Refills: 0 | Status: SHIPPED | OUTPATIENT
Start: 2021-02-24

## 2021-02-24 RX ADMIN — BUSPIRONE HYDROCHLORIDE 5 MG: 5 TABLET ORAL at 07:55

## 2021-02-24 RX ADMIN — PRENATAL VITAMINS-IRON FUMARATE 27 MG IRON-FOLIC ACID 0.8 MG TABLET 1 TABLET: at 07:55

## 2021-02-24 RX ADMIN — METOCLOPRAMIDE 10 MG: 10 TABLET ORAL at 07:55

## 2021-02-24 RX ADMIN — METOCLOPRAMIDE 10 MG: 10 TABLET ORAL at 12:36

## 2021-02-24 RX ADMIN — BUSPIRONE HYDROCHLORIDE 5 MG: 5 TABLET ORAL at 14:01

## 2021-02-24 RX ADMIN — DOCUSATE SODIUM 100 MG: 100 CAPSULE, LIQUID FILLED ORAL at 07:55

## 2021-02-24 RX ADMIN — Medication 0.25 MG: at 09:51

## 2021-02-24 ASSESSMENT — ACTIVITIES OF DAILY LIVING (ADL)
DRESS: INDEPENDENT
ORAL_HYGIENE: INDEPENDENT
LAUNDRY: WITH SUPERVISION
HYGIENE/GROOMING: INDEPENDENT

## 2021-02-24 NOTE — PROGRESS NOTES
Patient discharging 2/24/2021 accompanied by sister Shruthi and destination is back to her sisters house where she will stay.    Discharge paperwork reviewed and medications reviewed with Katy Purcell who verbalizes understanding.     Patient has verbalized understanding of discharge instructions and medication administration.     Patient reports that she is ready for discharge. Patient denies suicidal ideation and self injurious thoughts. Denies anxiety and depression right now.  Denies homicidal ideation. Denies auditory and visual hallucinations. Affect is upbeat and pleasant on approach. Sitting in the lounge area socializing with peers.     Copy of  AVS reviewed and signed for.    Medications from pharmacy received and signed for.     Security items reviewed and signed for.     Locker items received and signed for.     Survey provided

## 2021-02-24 NOTE — PROGRESS NOTES
Addendum:02/23/21 items were brought in:  2 sweaters  3 pair of leggings black  3 pair of black socks  1 bottle of aveno and misc.Toiletries    Medications: to security [id number]   Esitalapram  Lorazapam  Hydroxizine  Metoclopramide        A               Admission:  I am responsible for any personal items that are not sent to the safe or pharmacy.  [facility] is not responsible for loss, theft or damage of any property in my possession.    Signature:  _________________________________ Date: _______  Time: _____                                              Staff Signature:  ____________________________ Date: ________  Time: _____      2nd Staff person, if patient is unable/unwilling to sign:    Signature: ________________________________ Date: ________  Time: _____     Discharge:  [facility] has returned all of my personal belongings:    Signature: _________________________________ Date: ________  Time: _____                                          Staff Signature:  ____________________________ Date: ________  Time: _____

## 2021-02-24 NOTE — PLAN OF CARE
Patient states mood better, although still using Ativan for anxiety. She denies any suicidal thoughts or any safety concerns. Plan is to discharge today to home with a 30 day supply of medications and a aftercare plan in place. She has been participating in hospital programming.

## 2021-02-24 NOTE — DISCHARGE SUMMARY
"Psychiatric Discharge Summary    Katy Purcell MRN# [id number]   Age: 35 year old YOB: 1985     Date of Admission:  2/22/2021  Date of Discharge:  2/24/2021  5:43 PM  Admitting Physician:  Anastasiya Cabrera MD  Discharge Physician:  Nery Ellis DO         Event Leading to Hospitalization:   Per chart review, patient began reporting anxiety/depression concerns during an OBGYN appointment on 11/30/20. At that time, she reported low energy, fatigue. Patient was referred for therapy and declined medications at that time.      Due to ongoing anxiety and depression despite initiation of therapy, SSRIs were discussed during OBGYN appointment on 12/18/20, and Zoloft was initiated at dose of 25 mg daily with plan to increase to 50 mg daily after one week if tolerated.       Records from PCP appointment dated 1/12/21 indicate that patient experienced heart palpitations and SOB. She did not start Zoloft despite ongoing anxiety. Per PCP, \"She has a history of anxiety. Thinks this could be worsening. Not overly supported by SO with her anxiety diagnosis and actually has not started the daily medication for her anxiety due to some push back from SO. She has been taking the Atarax and that seems to help her anxiety and helps with her SOB.\"      At some point between 1/15 and 1/29 appointment with OBGYN, Zoloft was switched to Lexapro due to side effects from Zoloft. At 1/29 appointment, patient reported overall improvement in her anxiety after starting Lexapro 10 mg daily. She also continued prn hydroxyzine, primarily for sleep with noted improvement.      On 2/18, patient presented to [facility] ED with worsening anxiety in context of feeling \"stressed and overwhelmed.\" She reported that she \"can't live like this anymore,\" though denied any suicidal ideation. She reported fatigue, SOB, and nausea in the mornings. She was discharged to home and referred for day treatment programming.      On 2/19, patient saw " "OBGYN again with complaints of increased anxiety, somatic symptoms, and concerns about ineffectiveness of psychotropic medications. OBGYN provider consulted with Dr. Lopez in Psychiatry, and following changes were made: Lexapro was increased to 20 mg daily and Ativan 0.5-1 mg daily prn was added to target persistent  anxiety. She was also given a letter for a leave of absence from work.      Per DEC assessment: Patient again presented to [facility] ED on 2/22 with her sister. Patient reported that she is \"very suicidal with a plan to overdose on her prescription drugs\" according to DEC . Patient was interviewed for the day treatment program on 2/19, but then decided against it because she is uncomfortable disclosing her personal business to strangers.  Collateral was obtained from patient's sister.  Per DEC assessment, patient's sister reports that patient has been very anxious, in tears all the time, and her suicide threats have become more serious since being in the emergency department on 2/18.  Patient lives alone; her baby's father lives in [place], and they only see each other every other weekend.  Patient seems to feel embarrassed about becoming pregnant with a man she is only known since July, and whom she met on a dating website.  Patient calls a sister and mother several times daily, is continually crying, expressing helplessness and hopelessness.  Patient tells this writer that she would commit suicide if she does not receive more help; she says her therapist implied she needed to be more assertive in expressing her needs when they last spoke.  Patient ideally wants to work with an individual provider more intensively, and to get a medication her medications which help her sleep, manage her anxiety without having fatigue and drowsiness constantly.  Patient took a 5-week leave from her job at the beginning of the year, due to anxiety.  She returned to work last week, and felt she was unable to " "function, had brain fog and was unable to focus.  On Friday of last week, her OB/GYN wrote her a letter for her employer, prescribing another medical leave.  Patient works remotely for a company in [place], and says her employer has been very supportive so far.  Patient was entered to hand accessing the mother-baby program at [facility], but writer's impression is that program would be less intensive than DTP and have the same disadvantages for patient has DTP, namely group engagement in virtual meeting format.  What patient is asking for, a daily opportunity to work individually with the provider and/or a psychiatrist, does not exist.  Patient is interested in accessing support and does not feel safe going home.  Sister says she and mother do not know how to support patient.  She has declined the option to stay with either of them, is agitated and hopeless, but indecisive about where to turn.  Patient has a twin brother, who also struggles with depression, about whom the family has considered commitment, and patient has asked family to \"commit\" her to the hospital.  Patient tells writer she is going to come into the hospital on a voluntary basis.     Upon my interview with the patient, she reports that she has not experienced significant anxiety until approximately 2 months ago in the context of pregnancy.  She added \"anxiety comes out of nowhere when there is no reason to be anxious.  It is overwhelming.  She stated that she had tried therapy, which was not helpful.  She then tried Zoloft, though \"I never got past the half dose because it was increasing my anxiety too much.\"  She stated that her anxiety is \"really bad in the mornings.  I coughed so much that I throw up.\"  She notes that overall morning sickness has improved in the past month or so, but the anxiety persists despite that.  She has taken prescribed Ativan on 3 different occasions, and she does feel that it is helpful at 0.5 mg, " "though she does experience side effect of sedation.  When asked about prompting factors for her anxiety, she said \"well pregnancy is a big one.  I feel sick every single day and I am unable to do what I normally do, and I cannot enjoy life.  It is so sad.  I cannot concentrate or focus on anything and typically I am someone who goes goes goes, and I get things done.\"  She added that despite her pregnancy concerns, \"I am super excited to meet my baby and I am hoping that everything will be back to normal.\"  She mentioned that she discussed her anxiety with her sister in law who had a similar experience, and her sister-in-law reassured her that the anxiety will improve once she gets birth.  This appears to have been still some hope in the patient.  She states that her relationship with her significant other is supportive.  She cannot identify any other prompting factors for worsening anxiety at this time.  Of note, CTC note indicates that patient's father passed away 1.5 years ago, though patient did not mention this during our interview.     With regards to her depression, she reports several episodes of depression in her past during which time she does experience fleeting passive suicidal thinking.  She noted that prior to this episode, she has never experienced active suicidal ideation.  She denies history of suicide attempts or self-harm.  She reported that she tells her mom everything, and she did experience one fleeting thought of active suicidal ideation with a plan to overdose on her prescription medications.  She added \"I would never do that though because I do not want to hurt my baby or my mom or my sister.  I am well aware that it would ruin their lives and I could never let any of them down.\"  When asked if she acted on her plan or had any intent on acting on her plan, the patient replied \"gosh no!\"  She feels that worsening depression is very much intertwined with worsening anxiety.  She currently denies " "suicidal ideation.  Her goal of hospitalization is to make medication adjustments and \"have a clear-cut plan.\"       See Admission note by Anastasiya Cabrera MD on 2/23/2021 for additional details.          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  Anxiety in pregnancy, antepartum  24 weeks gestation of pregnancy  Elevated fasting BG         Labs:     Recent Results (from the past 168 hour(s))   EKG 12-lead, tracing only    Collection Time: 02/18/21  5:46 PM   Result Value Ref Range    Interpretation ECG Click View Image link to view waveform and result    CBC with platelets differential    Collection Time: 02/18/21  6:53 PM   Result Value Ref Range    WBC 9.9 4.0 - 11.0 10e9/L    RBC Count 3.99 3.8 - 5.2 10e12/L    Hemoglobin 13.0 11.7 - 15.7 g/dL    Hematocrit 38.5 35.0 - 47.0 %    MCV 97 78 - 100 fl    MCH 32.6 26.5 - 33.0 pg    MCHC 33.8 31.5 - 36.5 g/dL    RDW 12.5 10.0 - 15.0 %    Platelet Count 238 150 - 450 10e9/L    Diff Method Automated Method     % Neutrophils 81.0 %    % Lymphocytes 11.0 %    % Monocytes 6.3 %    % Eosinophils 0.6 %    % Basophils 0.5 %    % Immature Granulocytes 0.6 %    Nucleated RBCs 0 0 /100    Absolute Neutrophil 8.0 1.6 - 8.3 10e9/L    Absolute Lymphocytes 1.1 0.8 - 5.3 10e9/L    Absolute Monocytes 0.6 0.0 - 1.3 10e9/L    Absolute Eosinophils 0.1 0.0 - 0.7 10e9/L    Absolute Basophils 0.1 0.0 - 0.2 10e9/L    Abs Immature Granulocytes 0.1 0 - 0.4 10e9/L    Absolute Nucleated RBC 0.0    Basic metabolic panel    Collection Time: 02/18/21  6:53 PM   Result Value Ref Range    Sodium 138 133 - 144 mmol/L    Potassium 3.7 3.4 - 5.3 mmol/L    Chloride 106 94 - 109 mmol/L    Carbon Dioxide 26 20 - 32 mmol/L    Anion Gap 6 3 - 14 mmol/L    Glucose 103 (H) 70 - 99 mg/dL    Urea Nitrogen 12 7 - 30 mg/dL    Creatinine 0.47 (L) 0.52 - 1.04 mg/dL    GFR Estimate >90 >60 mL/min/[1.73_m2]    GFR Estimate If Black >90 >60 mL/min/[1.73_m2]    Calcium 8.6 8.5 - " 10.1 mg/dL   Asymptomatic Influenza A/B & SARS-CoV2 (COVID-19) Virus PCR Multiplex    Collection Time: 02/18/21  7:01 PM    Specimen: Nasopharyngeal   Result Value Ref Range    Flu A/B & SARS-COV-2 PCR Source Nasopharyngeal     SARS-CoV-2 PCR Result NEGATIVE     Influenza A PCR Negative NEG^Negative    Influenza B PCR Negative NEG^Negative    Respiratory Syncytial Virus PCR Negative NEG^Negative    Flu A/B & SARS-CoV-2 PCR Comment (Note)    Drug abuse screen 6 urine (tox)    Collection Time: 02/18/21  7:24 PM   Result Value Ref Range    Amphetamine Qual Urine Negative NEG^Negative    Barbiturates Qual Urine Negative NEG^Negative    Benzodiazepine Qual Urine Negative NEG^Negative    Cannabinoids Qual Urine Negative NEG^Negative    Cocaine Qual Urine Negative NEG^Negative    Ethanol Qual Urine Negative NEG^Negative    Opiates Qualitative Urine Negative NEG^Negative   Drug abuse screen 6 urine (tox)    Collection Time: 02/22/21 12:01 PM   Result Value Ref Range    Amphetamine Qual Urine Negative NEG^Negative    Barbiturates Qual Urine Negative NEG^Negative    Benzodiazepine Qual Urine Negative NEG^Negative    Cannabinoids Qual Urine Negative NEG^Negative    Cocaine Qual Urine Negative NEG^Negative    Ethanol Qual Urine Negative NEG^Negative    Opiates Qualitative Urine Negative NEG^Negative   HCG qualitative urine    Collection Time: 02/22/21 12:01 PM   Result Value Ref Range    HCG Qual Urine Positive (A) NEG^Negative   Asymptomatic SARS-CoV-2 COVID-19 Virus (Coronavirus) by PCR    Collection Time: 02/22/21  1:31 PM    Specimen: Nasopharyngeal   Result Value Ref Range    SARS-CoV-2 Virus Specimen Source Nasopharyngeal     SARS-CoV-2 PCR Result NEGATIVE     SARS-CoV-2 PCR Comment (Note)    Comprehensive metabolic panel    Collection Time: 02/23/21  7:29 AM   Result Value Ref Range    Sodium 136 133 - 144 mmol/L    Potassium 4.2 3.4 - 5.3 mmol/L    Chloride 104 94 - 109 mmol/L    Carbon Dioxide 24 20 - 32 mmol/L     Anion Gap 8 3 - 14 mmol/L    Glucose 119 (H) 70 - 99 mg/dL    Urea Nitrogen 10 7 - 30 mg/dL    Creatinine 0.49 (L) 0.52 - 1.04 mg/dL    GFR Estimate >90 >60 mL/min/[1.73_m2]    GFR Estimate If Black >90 >60 mL/min/[1.73_m2]    Calcium 8.7 8.5 - 10.1 mg/dL    Bilirubin Total 0.4 0.2 - 1.3 mg/dL    Albumin 2.8 (L) 3.4 - 5.0 g/dL    Protein Total 6.6 (L) 6.8 - 8.8 g/dL    Alkaline Phosphatase 74 40 - 150 U/L    ALT 16 0 - 50 U/L    AST 12 0 - 45 U/L   Lipid panel    Collection Time: 02/23/21  7:29 AM   Result Value Ref Range    Cholesterol 291 (H) <200 mg/dL    Triglycerides 148 <150 mg/dL    HDL Cholesterol 99 >49 mg/dL    LDL Cholesterol Calculated 162 (H) <100 mg/dL    Non HDL Cholesterol 192 (H) <130 mg/dL   TSH with free T4 reflex and/or T3 as indicated    Collection Time: 02/23/21  7:29 AM   Result Value Ref Range    TSH 1.42 0.40 - 4.00 mU/L              Consults:   Brief Medicine Note     Medicine consulted for 24 weeks gestation, with elevated fasting blood glucose on admission, at 119. Spoke with Dr. Ellis and recommended consult to OB/GYN, though they may not work this up with 1 hour glucose test while admitted.     Medicine will sign off. No further recommendations at this time. Please page the on-call [facility] for any intercurrent medical issues which arise.      Donna Bermeo PA-C  Hospitalist Service    Discharging physician attempted to contact OBGYN consult service, did not get response back. Instructed patient to follow up with her OBGYN upon discharge re: elevated BG.          Hospital Course:   Katy Purcell was admitted to [facility] with attending Nery Ellsi DO as a voluntary patient. The patient was placed under status 15 (15 minute checks) to ensure patient safety. Labs obtained as above. Symptoms and presentation on admission most consistent with MDD and anxiety. Lexapro was recently increased to 20 mg daily on 2/19. Patient agreed to continue to monitor response to the higher dose  at this time as a switch in selective serotonin reuptake inhibitor at this time would likely be premature. She noted overall improvement in Ativan prn prescribed PTA, though does report sedation. She was concerned about this medication being Category D in pregnancy and would like to avoid if possible. Admitting provider discussed multiple treatment options for temporarily relief of anxiety, including adding low dose hydroxyzine daily, adding Buspar or Gabapentin. Discussed treatment options with PharmD team as well. After discussion of R/B/A (she was also given educational handouts on pregnancy categories, R/B of medications), including risks in pregnancy and risks of untreated mental illness in pregnancy, patient elected to start a low dose of scheduled Buspar. She denied SI on admission and denies history of any intent. She was future oriented. She would benefit from CBT or other therapy upon discharge, and was amenable with referrals. She completed phone interview with Mother Baby program through [facility] and scheduled for intake on March 3rd. She reported she was feeling more hopeful and confident in treatment plan given conversation with admitting provider. She was still having some anxiety but reported she was feeling safe for discharge given these changes and requested to discharge home on 2/24.     Katy Purcell did participate in groups and was visible in the milieu.     The patient's symptoms of suicidal ideation improved. Today Katy Purcell reports having no thoughts of harming self or others. In addition, she has notable risk factors for self-harm, including single status and anxiety. However, risk is mitigated by commitment to family, sobriety, absence of past attempts, ability to volunteer a safety plan and history of seeking help when needed. Therefore, based on all available evidence including the factors cited above, she does not appear to be at imminent risk for self-harm, does not meet criteria  for a 72-hr hold, and therefore remains appropriate for ongoing outpatient level of care. Voluntary referral for Mother Baby program was offered, she accepted this offer.    Katy Purcell was discharged to home with sister. At the time of discharge Katy Purcell was determined to not be a danger to herself or others.          Discharge Medications:     Current Discharge Medication List      START taking these medications    Details   busPIRone (BUSPAR) 5 MG tablet Take 1 tablet (5 mg) by mouth 3 times daily  Qty: 90 tablet, Refills: 0    Associated Diagnoses: Generalized anxiety disorder      hydrOXYzine (ATARAX) 25 MG tablet Take 0.5-1 tablets (12.5-25 mg) by mouth 3 times daily as needed for anxiety (sleep)  Qty: 60 tablet, Refills: 0    Associated Diagnoses: Generalized anxiety disorder         CONTINUE these medications which have CHANGED    Details   escitalopram (LEXAPRO) 20 MG tablet Take 1 tablet (20 mg) by mouth At Bedtime  Qty: 30 tablet, Refills: 0    Associated Diagnoses: Generalized anxiety disorder      LORazepam (ATIVAN) 0.5 MG tablet Take 0.5 tablets (0.25 mg) by mouth daily as needed for anxiety (severe anxiety)  Qty: 15 tablet, Refills: 0    Associated Diagnoses: Generalized anxiety disorder         CONTINUE these medications which have NOT CHANGED    Details   metoclopramide (REGLAN) 10 MG tablet Take 10 mg by mouth 4 times daily (before meals and nightly)      Prenatal MV-Min-Fe Fum-FA-DHA (PRENATAL 1 PO) Take 1 tablet by mouth daily         STOP taking these medications       hydrOXYzine (VISTARIL) 25 MG capsule Comments:   Reason for Stopping:                    Psychiatric Examination:   Appearance:  awake, alert and adequately groomed  Attitude:  cooperative  Eye Contact:  good  Mood:  anxious and depressed but improved from admission  Affect:  mood congruent and intensity is blunted  Speech:  clear, coherent and normal prosody  Psychomotor Behavior:  no evidence of tardive dyskinesia,  dystonia, or tics  Thought Process:  logical, linear and goal oriented  Associations:  no loose associations  Thought Content:  no evidence of suicidal ideation or homicidal ideation and no evidence of psychotic thought  Insight:  good  Judgment:  intact  Oriented to:  time, person, and place  Attention Span and Concentration:  intact  Recent and Remote Memory:  intact  Language: English, fluent  Fund of Knowledge: appropriate  Muscle Strength and Tone: normal  Gait and Station: Normal         Discharge Plan:   Health Care Follow-up:   The [facility] Member Services number is [phone]. There is a [facility] Behavioral Health Personalized Assistance Line at [phone] to help you find a behavioral health provider if you find that you need more assistance in the future.You can call at [facility] @ [phone]  or Eloise Lacey @ [phone] - if you need further assistance after discharge. Use [facility]- [phone] - for transportation to your health care appointments.        Attend the telehealth videoTherapist session with  - Salina Goldman  on Thursday, Feb 25, @11AM - video  [facility]  Central Scheduling @ [phone]  [facility]  [address]  [place] [address]  Direct to Clinic: [phone]           Participate in your telehealth video intake session on Wednesday, March 3, 2021 @ 2PM.   [facility] Baby Program   [facility]  [address] [place] [address]  [address]  [place] [address]  For appointments call: [phone] (MAMA)  The Health Unit Coordinator has faxed these discharge instructions to Fax #: [phone]              Participate in your telehealth video Psychiatry medication management appointment with  Dr. Cabrera on  Appt Thursday, March 4, 2021 @ 11AM.  [facility]  [address],   [address]rd [place] [address]  Phone:  [phone]  Fax: [phone]  If your provider wants the records from this hospitalization, please use the [employer] CareEverywhere system while at their office.           Attend your previously scheduled OB appointments at [facility].  03/05/2021 Obstetrics & Gynecology Citlaly Barr APRN, CNM    [address]th [place] [address] [phone] (Fax)     03/23/2021 Obstetrics & Gynecology Chantal Rodriguez MD    [address] [place] [address] [phone] (Fax)           Attend all scheduled appointments with your outpatient providers. Call at least 24 hours in advance if you need to reschedule an appointment to ensure continued access to your outpatient providers.       Attestation:  Patient has been seen and evaluated by me, Nery Ellis DO on day of discharge. 40 minutes were spent in coordination of discharge planning.

## 2021-02-24 NOTE — PLAN OF CARE
At beginning of shift pt reported that the flashlight during accountability rounds kept her awake last night as well as at start of this shift.  After this discussion, staff attempted leaving pt's door cracked and minimizing flashlight usage.  Pt appears to have slept 6.75 after that.  Pt offered no further concerns and none noted.  Will continue to monitor and support plan of care.

## 2021-02-24 NOTE — CONSULTS
Brief Medicine Note    Medicine consulted for 24 weeks gestation, with elevated fasting blood glucose on admission, at 119. Spoke with Dr. Ellis and recommended consult to OB/GYN, though they may not work this up with 1 hour glucose test while admitted.    Medicine will sign off. No further recommendations at this time. Please page the on-call [facility] for any intercurrent medical issues which arise.     Donna Bermeo PA-C  Hospitalist Service  Contact information available via [facility] Paging/Directory

## 2021-02-24 NOTE — PLAN OF CARE
Work Completed:    I called Mother Baby Program per pt's chart.  [facility]  [address]  [address]  [place] [address]  For appointments call: [phone] ([facility])  Fax #: [phone]     I discovered this was the [facility] Program.  I left a vm.  Guillemrina called back from this program and did a phone assessment with the pt. Pt Is scheduled for an intake on Wednesday at 2.        Discharge plan or goal: Home with services                Barriers to discharge:  Pt is being discharged tonight.        Behavioral Discharge Planning and Instructions    Summary: You were admitted on 2/22/2021  due to suicidal ideation.  You were treated by Dr. Cabrera and Dr Ellis.  Medications were reviewed. You were an active participant in the group programming.  Your symptoms improved.  Aftercare services were arranged. You were assessed as not being at risk of harm to yourself. You were discharged on 2/24/21 from  to Home. Your family provided transportation.        Main Diagnosis:   MDD, recurrent, severe, with peripartum onset, without psychotic features  Anxiety in pregnancy, antepartum  Suicidal ideation  24 weeks gestation of pregnancy      Health Care Follow-up:   The [facility] Member Services number is [phone]. There is a [employer] Behavioral Health Personalized Assistance Line at 111.709.4723 to help you find a behavioral health provider if you find that you need more assistance in the future.You can call at [employer] [facility] [employer] @ [phone]  or Eloise Lacey @ [phone] - if you need further assistance after discharge. Use [employer]- [phone] - for transportation to your health care appointments.      Attend the telehealth videoTherapist session with  - Salina Godlman  on Thursday, Feb 25, @11AM - video  [facility]  Central Scheduling @ [phone]  [facility]  [address] [place]  [address]  Direct to Clinic: [phone]        Participate in your telehealth video intake session on Wednesday, March 3, 2021 @ 2PM.   [facility]  [address]  [address]  [place] [address]  For appointments call: [phone] ([facility])  The Health Unit Coordinator has faxed these discharge instructions to Fax #: [phone]           Participate in your telehealth video Psychiatry medication management appointment with  Dr. Cabrera on  Appt Thursday, March 4, 2021 @ 11AM.  [facility]  [address],   3rd [place] [address]  Phone: [phone]  Fax: [phone]  If your provider wants the records from this hospitalization, please use the EPIC CareEverywhere system while at their office.        Attend your previously scheduled OB appointments at [facility].  03/05/2021 Obstetrics & Gynecology Citlaly Barr, MAIRA, CNM    [address] [facility] 5th Floor    [place], [place] [address] [phone] (Fax)     03/23/2021 Obstetrics & Gynecology Chantal Rodriguez MD    [address]    [place], [place] [address] [phone] (Fax)         Attend all scheduled appointments with your outpatient providers. Call at least 24 hours in advance if you need to reschedule an appointment to ensure continued access to your outpatient providers.     Major Treatments, Procedures and Findings:  You were provided with: a psychiatric assessment, assessed for medical stability, medication evaluation and/or management, group therapy, milieu management and medical interventions      You were evaluated by dietary service.    Your cholesterol labs were taken. You should discuss these with your doctor.      Drug screen was negative.      Your covid-19 test was negative.  SARS-CoV-2 Virus Specimen Source 02/22/2021  1:31 PM 13   Nasopharyngeal     SARS-CoV-2 PCR Result 02/22/2021  1:31 PM 13   NEGATIVE    Comment:   SARS-CoV2 (COVID-19) RNA not detected, presumed negative.       Symptoms to Report: mood getting worse or thoughts of suicide    Early warning signs can include: increased depression or anxiety increased thoughts or behaviors of suicide or self-harm     Safety and Wellness:  Take all medicines as directed.  Make no changes unless your doctor suggests them.      Follow treatment recommendations.  Refrain from alcohol and non-prescribed drugs.  If there is a concern for safety, call 911.    Resources:   National [place] on Mental Illness (www.[place].daniel[place].org): 715.928.9182 or 801-442-2775.    General Medication Instructions:   See your medication sheet(s) for instructions.   Take all medicines as directed.  Make no changes unless your doctor suggests them.   Go to all your doctor visits.  Be sure to have all your required lab tests. This way, your medicines can be refilled on time.  Do not use any drugs not prescribed by your doctor.  Avoid alcohol.    Advance Directives:   Scanned document on file with [facility]? No scanned doc  Is document scanned? Pt states no documents  Honoring Choices Your Rights Handout: Informed and given  Was more information offered? Pt declined        Crisis services are available 24/7 if you are having a mental health crisis.    COPE (Community Outreach for Psychiatric Emergencies): [phone]    In the [facility], call **CRISIS (218882) from a cell phone to talk to a team of professionals who can help you.    Crisis Text Line: Text [place] to 608045    These are   crisis residences where you can stay for a short time if you feel like you need to stabilize but do not need to go to the hospital.    [facility]  - Crisis Beds  1.  [facility] Crisis Stabilization Program  [address] [place] [address]  Phone:[phone]      2.  [facility]  [address] [place]  [address]  Phone: [phone]    3.  [facility]  [address] [place] [address]  Phone: [phone]      If you ever need immediate access to services, [facility] has a walk in triage services to help you with what you need. This Triage Center is located in Room N141 at [address] [facility]. Go through the front door of [address] [place], and follow signs to N141.   Triage hours:9:00 am - 5:00 pm  Triage Phone Number: [phone]      The Treatment team has appreciated the opportunity to work with you. If you have any questions or concerns about your recent admission, you can contact the unit which can receive your call 24 hours a day, 7 days a week. They will be able to get in touch with a Provider if needed. The unit number is [phone] .

## 2021-02-24 NOTE — PLAN OF CARE
Patient participated in all 3 ot groups. She was quiet throughout all groups and when prompted for a response gave limited information. She was however actively attentive to the information and content of each group.  During project group she worked on a new project. She was focused and made progress on her work. She was quiet. She responded to writer with a friendlier tone than the previous day.   She participated in self-compassion group and was attentive throughout, taking notes and following along with hand outs. She was prompted for a response and provided some initiated contributions toward the end. She wrote down resources for further exploration.  Patient participated in relaxation/meditation group. She was engaged for the whole group, although did shift, open her eyes, check the clock periodically. She shared her experience and felt relaxed, yawing a few times at the end of group. She shared different things she noticed and felt the meditation helpful.

## 2021-02-24 NOTE — DISCHARGE INSTRUCTIONS
Behavioral Discharge Planning and Instructions    Summary: You were admitted on 2/22/2021  due to suicidal ideation.  You were treated by Dr. Cabrera and Dr Ellis.  Medications were reviewed. You were an active participant in the group programming.  Your symptoms improved.  Aftercare services were arranged. You were assessed as not being at risk of harm to yourself. You were discharged on 2/24/21 from 30 to Home. Your family provided transportation.        Main Diagnosis:   MDD, recurrent, severe, with peripartum onset, without psychotic features  Anxiety in pregnancy, antepartum  Suicidal ideation  24 weeks gestation of pregnancy      Health Care Follow-up:   The [facility] Member Services number is [phone]. There is a [facility] Behavioral Health Personalized Assistance Line at [phone] to help you find a behavioral health provider if you find that you need more assistance in the future.You can call at [employer] @ [phone]  or Eloise Lacey @ [phone] - if you need further assistance after discharge. Use [employer]- [phone] - for transportation to your health care appointments.      Attend the telehealth videoTherapist session with  - Salina Goldman  on Thursday, Feb 25, @11AM - video  [facility]  Central Scheduling @ [phone]  [facility]  [address]  [place] [address]  Direct to Clinic: [phone]        Participate in your telehealth video intake session on Wednesday, March 3, 2021 @ 2PM.   [facility] Mother Baby Program   [facility]  [address] [place] [address]  [address]  [place] [address]  For appointments call: [phone] ([facility])  The Health Unit Coordinator has faxed these discharge instructions to Fax #: [phone]           Participate in your telehealth video Psychiatry medication management appointment with  Dr. Cabrera on  Appt Thursday, March 4, 2021 @  11AM.  [facility]  [address],   3rd [place] [address]  Phone: [phone]  Fax: [phone]  If your provider wants the records from this hospitalization, please use the EPIC CareEverywhere system while at their office.        Attend your previously scheduled OB appointments at [facility].  03/05/2021 Obstetrics & Gynecology Citlaly Barr, APRN, CNM    [address] [facility] 5th Floor    [place], [place] [address] [phone] (Fax)     03/23/2021 Obstetrics & Gynecology Chantal Rodriguez MD    [address] [facility] [place] [address] [phone] (Fax)         Attend all scheduled appointments with your outpatient providers. Call at least 24 hours in advance if you need to reschedule an appointment to ensure continued access to your outpatient providers.     Major Treatments, Procedures and Findings:  You were provided with: a psychiatric assessment, assessed for medical stability, medication evaluation and/or management, group therapy, milieu management and medical interventions      You were evaluated by dietary service.    Your cholesterol labs were taken. You should discuss these with your doctor.      Drug screen was negative.      Your covid-19 test was negative.  SARS-CoV-2 Virus Specimen Source 02/22/2021  1:31 PM 13   Nasopharyngeal    SARS-CoV-2 PCR Result 02/22/2021  1:31 PM 13   NEGATIVE    Comment:   SARS-CoV2 (COVID-19) RNA not detected, presumed negative.       Symptoms to Report: mood getting worse or thoughts of suicide    Early warning signs can include: increased depression or anxiety increased thoughts or behaviors of suicide or self-harm     Safety and Wellness:  Take all medicines as directed.  Make no changes unless your doctor suggests them.      Follow treatment recommendations.  Refrain from alcohol and non-prescribed drugs.  If there is a concern for  safety, call 911.    Resources:   National [place] on Mental Illness (www.[place].daniela.org): 784.567.7673 or 400-916-7953.    General Medication Instructions:   See your medication sheet(s) for instructions.   Take all medicines as directed.  Make no changes unless your doctor suggests them.   Go to all your doctor visits.  Be sure to have all your required lab tests. This way, your medicines can be refilled on time.  Do not use any drugs not prescribed by your doctor.  Avoid alcohol.    Advance Directives:   Scanned document on file with [place]? No scanned doc  Is document scanned? Pt states no documents  Honoring Choices Your Rights Handout: Informed and given  Was more information offered? Pt declined        Crisis services are available 24/7 if you are having a mental health crisis.    COPE (Community Outreach for Psychiatric Emergencies): [phone]    In the [facility], call **CRISIS ([phone]) from a cell phone to talk to a team of professionals who can help you.    Crisis Text Line: Text [place] to 957310    These are   crisis residences where you can stay for a short time if you feel like you need to stabilize but do not need to go to the hospital.    [facility]  - Crisis Beds  1.  [facility] Crisis Stabilization Program  [address] [place] [address]  Phone:[phone]      2.  [facility] Crisis Residence  [address] [place] [address]  Phone: [phone]    3.  [facility] Crisis Residence  [address] [place] [address]  Phone: [phone]      If you ever need immediate access to services, [facility] has a walk in triage services to help you with what you need. This Triage Center is located in Room N141 at [address] [facility]. Go through the front door of [address] [place], and follow signs to N141.   Triage hours:9:00 am - 5:00 pm  Triage Phone Number: [phone]      The Treatment team has appreciated the opportunity to work with  you. If you have any questions or concerns about your recent admission, you can contact the unit which can receive your call 24 hours a day, 7 days a week. They will be able to get in touch with a Provider if needed. The unit number is [phone] .

## 2021-02-24 NOTE — PLAN OF CARE
Patient has been out in the milieu for majority of the shift. Patient denies suicidal ideation and self injurious. Continues to have depression and anxiety. Denies auditory and visual hallucinations. Ate dinner. Med compliant. Received an egg crate mattress for her bed, sound machine and aromatherapy to help her sleep better. Pleasant and cooperative on the unit.     Patient evaluation continues. Assessed mood,anxiety,thoughts and behavior.     Patient gradually progressing towards goals.    Patient is encouraged to participate in groups and assisted to develop healthy coping skills.     VS reviewed: /78   Pulse 81   Temp 98.4  F (36.9  C) (Oral)   Resp 16   Wt 74.4 kg (164 lb)   LMP 09/04/2020 (Within Days)   SpO2 96%   Breastfeeding No     Length of stay: 1    Refer to daily team meeting notes for individualized plan of care. Nursing will continue to assess.

## 2021-02-24 NOTE — PROGRESS NOTES
Pt attended the Psycho-education group on Illness Management and Recovery module #2: Practical Facts about Mental Illness.  Pt was interested in the material and plans to explore the namimn  website and the annual walk in September.         02/24/21 1400   Psycho Education   Type of Intervention structured groups   Response participates, initiates socially appropriate   Hours 1   Treatment Detail IMR#2: Practical Facts on Mental Illness

## 2021-03-07 ENCOUNTER — HEALTH MAINTENANCE LETTER (OUTPATIENT)
Age: 36
End: 2021-03-07

## 2021-03-24 DIAGNOSIS — F41.1 GENERALIZED ANXIETY DISORDER: ICD-10-CM

## 2021-03-26 RX ORDER — ESCITALOPRAM OXALATE 20 MG/1
TABLET ORAL
Qty: 30 TABLET | Refills: 0 | OUTPATIENT
Start: 2021-03-26

## 2021-10-11 ENCOUNTER — HEALTH MAINTENANCE LETTER (OUTPATIENT)
Age: 36
End: 2021-10-11

## 2021-12-19 ENCOUNTER — NURSE TRIAGE (OUTPATIENT)
Dept: NURSING | Facility: CLINIC | Age: 36
End: 2021-12-19

## 2021-12-19 ENCOUNTER — OFFICE VISIT (OUTPATIENT)
Dept: URGENT CARE | Facility: URGENT CARE | Age: 36
End: 2021-12-19
Payer: COMMERCIAL

## 2021-12-19 VITALS
DIASTOLIC BLOOD PRESSURE: 68 MMHG | SYSTOLIC BLOOD PRESSURE: 122 MMHG | HEART RATE: 87 BPM | OXYGEN SATURATION: 99 % | RESPIRATION RATE: 16 BRPM | TEMPERATURE: 98.2 F

## 2021-12-19 DIAGNOSIS — Z20.822 SUSPECTED COVID-19 VIRUS INFECTION: Primary | ICD-10-CM

## 2021-12-19 DIAGNOSIS — J20.9 ACUTE BRONCHITIS, UNSPECIFIED ORGANISM: ICD-10-CM

## 2021-12-19 PROCEDURE — U0005 INFEC AGEN DETEC AMPLI PROBE: HCPCS | Performed by: FAMILY MEDICINE

## 2021-12-19 PROCEDURE — U0003 INFECTIOUS AGENT DETECTION BY NUCLEIC ACID (DNA OR RNA); SEVERE ACUTE RESPIRATORY SYNDROME CORONAVIRUS 2 (SARS-COV-2) (CORONAVIRUS DISEASE [COVID-19]), AMPLIFIED PROBE TECHNIQUE, MAKING USE OF HIGH THROUGHPUT TECHNOLOGIES AS DESCRIBED BY CMS-2020-01-R: HCPCS | Performed by: FAMILY MEDICINE

## 2021-12-19 PROCEDURE — 99214 OFFICE O/P EST MOD 30 MIN: CPT | Performed by: FAMILY MEDICINE

## 2021-12-19 RX ORDER — NYSTATIN AND TRIAMCINOLONE ACETONIDE 100000; 1 [USP'U]/G; MG/G
CREAM TOPICAL
COMMUNITY
Start: 2021-06-12 | End: 2022-04-26

## 2021-12-19 RX ORDER — PEN NEEDLE, DIABETIC 29 G X1/2"
NEEDLE, DISPOSABLE MISCELLANEOUS
COMMUNITY
Start: 2021-04-15 | End: 2022-04-26

## 2021-12-19 RX ORDER — ACETAMINOPHEN 325 MG/1
650 TABLET ORAL
COMMUNITY
Start: 2021-06-08 | End: 2022-04-26

## 2021-12-19 RX ORDER — DOXYCYCLINE 100 MG/1
100 CAPSULE ORAL 2 TIMES DAILY
Qty: 20 CAPSULE | Refills: 0 | Status: SHIPPED | OUTPATIENT
Start: 2021-12-19 | End: 2021-12-29

## 2021-12-19 RX ORDER — BLOOD-GLUCOSE METER
EACH MISCELLANEOUS SEE ADMIN INSTRUCTIONS
COMMUNITY
Start: 2021-03-30 | End: 2022-04-26

## 2021-12-19 RX ORDER — BENZONATATE 200 MG/1
200 CAPSULE ORAL 3 TIMES DAILY PRN
Qty: 20 CAPSULE | Refills: 0 | Status: SHIPPED | OUTPATIENT
Start: 2021-12-19 | End: 2022-04-26

## 2021-12-19 RX ORDER — BUSPIRONE HYDROCHLORIDE 15 MG/1
15 TABLET ORAL 2 TIMES DAILY
COMMUNITY
Start: 2021-10-13

## 2021-12-19 RX ORDER — INSULIN HUMAN 100 [IU]/ML
INJECTION, SUSPENSION SUBCUTANEOUS
COMMUNITY
Start: 2021-06-08 | End: 2022-04-26

## 2021-12-19 RX ORDER — BLOOD SUGAR DIAGNOSTIC
STRIP MISCELLANEOUS
COMMUNITY
Start: 2021-06-12 | End: 2022-04-26

## 2021-12-19 RX ORDER — LANCETS
EACH MISCELLANEOUS
COMMUNITY
Start: 2021-03-30 | End: 2022-04-26

## 2021-12-19 RX ORDER — MIRTAZAPINE 7.5 MG/1
7.5 TABLET, FILM COATED ORAL AT BEDTIME
COMMUNITY
Start: 2021-11-24

## 2021-12-19 NOTE — LETTER
[facility] URGENT CARE [facility]  [address] [address]  [phone]      December 19, 2021    RE:  Katy Purcell                                                                                                                                                       Critical access hospital[facility] [address]            To whom it may concern:    Katy Purcell is under my professional care for    Suspected COVID-19 virus infection  Acute bronchitis, unspecified organism.    Testing for Covid 19 has been ordered.  If the covid test is positive will need to quarantine at least 10 days and may return to work/ school if fever free at least 1 day.  If the covid test is negative may return to work/ school if symptom free        Sincerely,        Jordana Hernandez MD    [facility]

## 2021-12-19 NOTE — PATIENT INSTRUCTIONS

## 2021-12-19 NOTE — PROGRESS NOTES
ASSESSMENT/ PLAN:    Suspected COVID-19 virus infection     - Symptomatic; Yes; 11/28/2021 COVID-19 Virus (Coronavirus) by PCR Nose    Acute bronchitis, unspecified organism     - doxycycline hyclate (VIBRAMYCIN) 100 MG capsule; Take 1 capsule (100 mg) by mouth 2 times daily for 10 days  - benzonatate (TESSALON) 200 MG capsule; Take 1 capsule (200 mg) by mouth 3 times daily as needed for cough      We discussed that based on the patient's description of symptoms, history and physical exam, that a bacterial infection has likely developed in the chest requiring treatment with antibiotics.      The patient is advised to monitor the symptoms of the illness, and if worsening,  worse chest pain, increased productive sputum, persistent fevers/ chills, shortness of breath, then seek re-evaluation with primary care, UC or ER     Symptomatic measures encouraged, humidified air, plenty of fluids.  Patient may consider OTC expectorant and/or cough suppressant to treat symptoms.   acetaminophen, ibuprofen for pain and fever    Return if worsening  -------------------------------------------------------------------------------------------------------------------------------    SUBJECTIVE:  Chief Complaint   Patient presents with     Cough     cough that is getting worse X 2-3 weeks     Katy Purcell is a 36 year old female who presents to the clinic today with a chief complaint of cough  and central chest pain. for 3 week(s).  Patient denies shortness of breath., pleuritic chest pain and wheezing.  Her cough is described as persistent, daytime, nightime and productive of yellow sputum.    The patient's symptoms are moderate and worsening.  Associated symptoms include malaise. The patient's symptoms are exacerbated by no particular triggers  Patient has been using OTC cough suppressants  to improve symptoms.    Past Medical History:   Diagnosis Date     Anxiety      Patient Active Problem List   Diagnosis     Generalized  "anxiety disorder     Suicidal ideation     24 weeks gestation of pregnancy     Current moderate episode of major depressive disorder, unspecified whether recurrent (H)       ALLERGIES:  Benzoyl peroxide    ACCU-CHEK GUIDE test strip, USE TO TEST FOUR TIMES DAILY  acetaminophen (TYLENOL) 325 MG tablet, Take 650 mg by mouth  BD INSULIN SYRINGE U/F 31G X 5/16\" 0.5 ML miscellaneous, USE TO INJECT UNDER THE SKIN AS NEEDED  blood glucose monitoring (SOFTCLIX) lancets, USE TO TEST FOUR TIMES DAILY  Blood Glucose Monitoring Suppl (ACCU-CHEK GUIDE) w/Device KIT, See Admin Instructions  busPIRone (BUSPAR) 15 MG tablet, Take 15 mg by mouth 2 times daily  busPIRone (BUSPAR) 5 MG tablet, Take 1 tablet (5 mg) by mouth 3 times daily  HUMULIN N VIAL 100 UNIT/ML susp, ADMINISTER 12 UNITS UNDER THE SKIN EVERY EVENING FOR DIABETES MELLITUS  hydrOXYzine (ATARAX) 25 MG tablet, Take 0.5-1 tablets (12.5-25 mg) by mouth 3 times daily as needed for anxiety (sleep)  LORazepam (ATIVAN) 0.5 MG tablet, Take 0.5 tablets (0.25 mg) by mouth daily as needed for anxiety (severe anxiety)  metoclopramide (REGLAN) 10 MG tablet, Take 10 mg by mouth 4 times daily (before meals and nightly)  mirtazapine (REMERON) 7.5 MG tablet, TAKE 1 TABLET BY MOUTH DAILY AT BEDTIME  nystatin-triamcinolone (MYCOLOG II) 191796-7.1 UNIT/GM-% external cream, APPLY TO INCISIONAL YEAST INFECTION TWICE DAILY FOR 5 TO 7 DAYS  Prenatal MV-Min-Fe Fum-FA-DHA (PRENATAL 1 PO), Take 1 tablet by mouth daily  escitalopram (LEXAPRO) 20 MG tablet, Take 1 tablet (20 mg) by mouth At Bedtime (Patient not taking: Reported on 12/19/2021)    No current facility-administered medications on file prior to visit.      Social History     Tobacco Use     Smoking status: Never Smoker     Smokeless tobacco: Never Used   Substance Use Topics     Alcohol use: Not Currently       No family history on file.      ROS  CONSTITUTIONAL:low grade fever, chills,    INTEGUMENTARY/SKIN: NEGATIVE for worrisome " rashes,   or lesions  EYES: NEGATIVE for vision changes or irritation  ENT/MOUTH: NEGATIVE for ear, mouth and throat problems  GI: NEGATIVE for nausea, abdominal pain,  or change in bowel habits    OBJECTIVE:  /68   Pulse 87   Temp 98.2  F (36.8  C) (Tympanic)   Resp 16   SpO2 99%   GENERAL APPEARANCE: alert and moderate distress, frequent congested cough  EYES: EOMI,  PERRL, conjunctiva clear  HENT: ear canals and TM's normal.  Nose and mouth without ulcers, erythema or lesions  NECK: supple, nontender, no lymphadenopathy  RESP: lungs clear to auscultation - no rales, rhonchi or wheezes  CV: regular rates and rhythm, normal S1 S2, no murmur noted  ABDOMEN:  soft, nontender, no HSM or masses and bowel sounds normal  NEURO: Normal strength and tone, sensory exam grossly normal,  normal speech and mentation  SKIN: no suspicious lesions or rashes

## 2021-12-20 LAB — SARS-COV-2 RNA RESP QL NAA+PROBE: NEGATIVE

## 2021-12-20 NOTE — TELEPHONE ENCOUNTER
Went to Urgent care DX with Acute bronchitis, unspecified organism.  Was prescribed Doxycyline and Tessalon Pearls.   Patient is wondering if theses medications are safe to take while breast feeding.  Advised patient to call and speak with pharmacist.    Luz Hartley RN, MA  [facility] Triage Nurse Advisor    Reason for Disposition    [1] Caller has URGENT medication question about med that PCP or specialist prescribed AND [2] triager unable to answer question    Protocols used: MEDICATION QUESTION CALL-A-

## 2022-03-27 ENCOUNTER — HEALTH MAINTENANCE LETTER (OUTPATIENT)
Age: 37
End: 2022-03-27

## 2022-04-26 ENCOUNTER — OFFICE VISIT (OUTPATIENT)
Dept: URGENT CARE | Facility: URGENT CARE | Age: 37
End: 2022-04-26
Payer: COMMERCIAL

## 2022-04-26 VITALS
HEIGHT: 65 IN | OXYGEN SATURATION: 99 % | HEART RATE: 61 BPM | BODY MASS INDEX: 25.49 KG/M2 | DIASTOLIC BLOOD PRESSURE: 77 MMHG | WEIGHT: 153 LBS | TEMPERATURE: 96.8 F | SYSTOLIC BLOOD PRESSURE: 117 MMHG | RESPIRATION RATE: 21 BRPM

## 2022-04-26 DIAGNOSIS — R11.0 NAUSEA: ICD-10-CM

## 2022-04-26 DIAGNOSIS — R42 DIZZINESS: Primary | ICD-10-CM

## 2022-04-26 DIAGNOSIS — R51.9 ACUTE NONINTRACTABLE HEADACHE, UNSPECIFIED HEADACHE TYPE: ICD-10-CM

## 2022-04-26 DIAGNOSIS — H53.8 BLURRED VISION: ICD-10-CM

## 2022-04-26 PROCEDURE — 99214 OFFICE O/P EST MOD 30 MIN: CPT | Performed by: FAMILY MEDICINE

## 2022-04-26 NOTE — PROGRESS NOTES
"SUBJECTIVE: Katy Purcell is a 36 year old female presenting with a chief complaint of constant dizziness at rest/ha/blurred vision.  Onset of symptoms was 1 day(s) ago.  Course of illness is worsening.    Severity moderate  Current and Associated symptoms: nausea  Treatment measures tried include None tried.    Past Medical History:   Diagnosis Date     Anxiety      Allergies   Allergen Reactions     Benzoyl Peroxide      PN: LW Reaction: HIVES     Social History     Tobacco Use     Smoking status: Never Smoker     Smokeless tobacco: Never Used   Substance Use Topics     Alcohol use: Not Currently       ROS:  SKIN: no rash  GI: no vomiting    OBJECTIVE:  /77 (BP Location: Right arm, Patient Position: Sitting, Cuff Size: Adult Regular)   Pulse 61   Temp 96.8  F (36  C) (Tympanic)   Resp 21   Ht 1.651 m (5' 5\")   Wt 69.4 kg (153 lb)   SpO2 99%   BMI 25.46 kg/m  GENERAL APPEARANCE: healthy, alert and no distress  EYES: EOMI,  PERRL, conjunctiva clear  HENT: ear canals and TM's normal.  Nose and mouth without ulcers, erythema or lesions  RESP: lungs clear to auscultation - no rales, rhonchi or wheezes  CV: regular rates and rhythm, normal S1 S2, no murmur noted  NEURO: Normal strength and tone, sensory exam grossly normal,  normal speech and mentation  SKIN: no suspicious lesions or rashes      ICD-10-CM    1. Dizziness  R42    2. Acute nonintractable headache, unspecified headache type  R51.9    3. Blurred vision  H53.8    4. Nausea  R11.0      Pt will go through ED for w/u of constant dizziness at rest/ha/blurred vision    "

## 2022-09-24 ENCOUNTER — HEALTH MAINTENANCE LETTER (OUTPATIENT)
Age: 37
End: 2022-09-24

## 2023-05-08 ENCOUNTER — HEALTH MAINTENANCE LETTER (OUTPATIENT)
Age: 38
End: 2023-05-08

## 2024-07-14 ENCOUNTER — HEALTH MAINTENANCE LETTER (OUTPATIENT)
Age: 39
End: 2024-07-14